# Patient Record
Sex: FEMALE | Race: WHITE | Employment: OTHER | URBAN - METROPOLITAN AREA
[De-identification: names, ages, dates, MRNs, and addresses within clinical notes are randomized per-mention and may not be internally consistent; named-entity substitution may affect disease eponyms.]

---

## 2017-03-13 ENCOUNTER — OFFICE VISIT (OUTPATIENT)
Dept: CARDIOLOGY CLINIC | Age: 72
End: 2017-03-13

## 2017-03-13 VITALS
DIASTOLIC BLOOD PRESSURE: 64 MMHG | WEIGHT: 204 LBS | BODY MASS INDEX: 38.51 KG/M2 | HEART RATE: 70 BPM | HEIGHT: 61 IN | SYSTOLIC BLOOD PRESSURE: 122 MMHG

## 2017-03-13 DIAGNOSIS — I47.1 PAROXYSMAL SUPRAVENTRICULAR TACHYCARDIA (HCC): Primary | ICD-10-CM

## 2017-03-13 DIAGNOSIS — E78.5 HYPERLIPIDEMIA, UNSPECIFIED HYPERLIPIDEMIA TYPE: ICD-10-CM

## 2017-03-13 DIAGNOSIS — R07.89 OTHER CHEST PAIN: ICD-10-CM

## 2017-03-13 DIAGNOSIS — I10 ESSENTIAL HYPERTENSION, BENIGN: ICD-10-CM

## 2017-03-13 DIAGNOSIS — I25.10 ATHEROSCLEROSIS OF NATIVE CORONARY ARTERY OF NATIVE HEART WITHOUT ANGINA PECTORIS: ICD-10-CM

## 2017-03-13 PROBLEM — R07.9 CHEST PAIN: Status: ACTIVE | Noted: 2017-03-13

## 2017-03-13 PROCEDURE — 99214 OFFICE O/P EST MOD 30 MIN: CPT | Performed by: INTERNAL MEDICINE

## 2017-03-21 DIAGNOSIS — R94.39 ABNORMAL CARDIOVASCULAR STRESS TEST: Primary | ICD-10-CM

## 2017-03-22 RX ORDER — CLOPIDOGREL BISULFATE 75 MG/1
75 TABLET ORAL DAILY
Qty: 90 TABLET | Refills: 3 | Status: SHIPPED | OUTPATIENT
Start: 2017-03-22 | End: 2017-09-25 | Stop reason: SDUPTHER

## 2017-04-05 ENCOUNTER — OFFICE VISIT (OUTPATIENT)
Dept: CARDIOLOGY CLINIC | Age: 72
End: 2017-04-05

## 2017-04-05 VITALS
HEART RATE: 62 BPM | SYSTOLIC BLOOD PRESSURE: 90 MMHG | HEIGHT: 61 IN | WEIGHT: 199 LBS | BODY MASS INDEX: 37.57 KG/M2 | DIASTOLIC BLOOD PRESSURE: 58 MMHG

## 2017-04-05 DIAGNOSIS — I25.10 ATHEROSCLEROSIS OF NATIVE CORONARY ARTERY OF NATIVE HEART WITHOUT ANGINA PECTORIS: Primary | ICD-10-CM

## 2017-04-05 DIAGNOSIS — I10 ESSENTIAL HYPERTENSION, BENIGN: ICD-10-CM

## 2017-04-05 DIAGNOSIS — E78.49 OTHER HYPERLIPIDEMIA: ICD-10-CM

## 2017-04-05 PROCEDURE — 99214 OFFICE O/P EST MOD 30 MIN: CPT | Performed by: NURSE PRACTITIONER

## 2017-07-06 ENCOUNTER — OFFICE VISIT (OUTPATIENT)
Dept: CARDIOLOGY CLINIC | Age: 72
End: 2017-07-06

## 2017-07-06 VITALS
WEIGHT: 198 LBS | HEART RATE: 62 BPM | DIASTOLIC BLOOD PRESSURE: 58 MMHG | BODY MASS INDEX: 36.44 KG/M2 | SYSTOLIC BLOOD PRESSURE: 106 MMHG | HEIGHT: 62 IN

## 2017-07-06 DIAGNOSIS — E78.49 OTHER HYPERLIPIDEMIA: ICD-10-CM

## 2017-07-06 DIAGNOSIS — I25.10 ATHEROSCLEROSIS OF NATIVE CORONARY ARTERY OF NATIVE HEART WITHOUT ANGINA PECTORIS: Primary | ICD-10-CM

## 2017-07-06 DIAGNOSIS — I10 ESSENTIAL HYPERTENSION, BENIGN: ICD-10-CM

## 2017-07-06 PROCEDURE — 99214 OFFICE O/P EST MOD 30 MIN: CPT | Performed by: INTERNAL MEDICINE

## 2017-09-20 RX ORDER — RANOLAZINE 500 MG/1
500 TABLET, EXTENDED RELEASE ORAL 2 TIMES DAILY
Qty: 60 TABLET | Refills: 5 | Status: SHIPPED | OUTPATIENT
Start: 2017-09-20 | End: 2017-09-25 | Stop reason: SDUPTHER

## 2017-09-25 ENCOUNTER — OFFICE VISIT (OUTPATIENT)
Dept: CARDIOLOGY CLINIC | Age: 72
End: 2017-09-25

## 2017-09-25 VITALS
DIASTOLIC BLOOD PRESSURE: 60 MMHG | WEIGHT: 198 LBS | HEIGHT: 61 IN | HEART RATE: 60 BPM | BODY MASS INDEX: 37.38 KG/M2 | SYSTOLIC BLOOD PRESSURE: 110 MMHG

## 2017-09-25 DIAGNOSIS — E78.5 HYPERLIPIDEMIA, UNSPECIFIED HYPERLIPIDEMIA TYPE: ICD-10-CM

## 2017-09-25 DIAGNOSIS — I25.10 ATHEROSCLEROSIS OF NATIVE CORONARY ARTERY OF NATIVE HEART WITHOUT ANGINA PECTORIS: Primary | ICD-10-CM

## 2017-09-25 DIAGNOSIS — I10 ESSENTIAL HYPERTENSION, BENIGN: ICD-10-CM

## 2017-09-25 PROCEDURE — 99214 OFFICE O/P EST MOD 30 MIN: CPT | Performed by: INTERNAL MEDICINE

## 2017-09-25 RX ORDER — RANOLAZINE 500 MG/1
500 TABLET, EXTENDED RELEASE ORAL 2 TIMES DAILY
Qty: 180 TABLET | Refills: 3 | Status: SHIPPED | OUTPATIENT
Start: 2017-09-25 | End: 2018-03-19 | Stop reason: SDUPTHER

## 2017-09-25 RX ORDER — CLOPIDOGREL BISULFATE 75 MG/1
75 TABLET ORAL DAILY
Qty: 90 TABLET | Refills: 3 | Status: SHIPPED | OUTPATIENT
Start: 2017-09-25 | End: 2018-03-19 | Stop reason: SDUPTHER

## 2018-03-19 ENCOUNTER — OFFICE VISIT (OUTPATIENT)
Dept: CARDIOLOGY CLINIC | Age: 73
End: 2018-03-19

## 2018-03-19 VITALS
DIASTOLIC BLOOD PRESSURE: 56 MMHG | WEIGHT: 192 LBS | HEART RATE: 60 BPM | BODY MASS INDEX: 36.25 KG/M2 | SYSTOLIC BLOOD PRESSURE: 118 MMHG | HEIGHT: 61 IN

## 2018-03-19 DIAGNOSIS — I25.10 ATHEROSCLEROSIS OF NATIVE CORONARY ARTERY OF NATIVE HEART WITHOUT ANGINA PECTORIS: ICD-10-CM

## 2018-03-19 DIAGNOSIS — E78.5 HYPERLIPIDEMIA, UNSPECIFIED HYPERLIPIDEMIA TYPE: Primary | ICD-10-CM

## 2018-03-19 DIAGNOSIS — I10 ESSENTIAL HYPERTENSION, BENIGN: ICD-10-CM

## 2018-03-19 PROCEDURE — 3017F COLORECTAL CA SCREEN DOC REV: CPT | Performed by: INTERNAL MEDICINE

## 2018-03-19 PROCEDURE — 99214 OFFICE O/P EST MOD 30 MIN: CPT | Performed by: INTERNAL MEDICINE

## 2018-03-19 PROCEDURE — 1036F TOBACCO NON-USER: CPT | Performed by: INTERNAL MEDICINE

## 2018-03-19 PROCEDURE — 1123F ACP DISCUSS/DSCN MKR DOCD: CPT | Performed by: INTERNAL MEDICINE

## 2018-03-19 PROCEDURE — G8428 CUR MEDS NOT DOCUMENT: HCPCS | Performed by: INTERNAL MEDICINE

## 2018-03-19 PROCEDURE — G8417 CALC BMI ABV UP PARAM F/U: HCPCS | Performed by: INTERNAL MEDICINE

## 2018-03-19 PROCEDURE — G8484 FLU IMMUNIZE NO ADMIN: HCPCS | Performed by: INTERNAL MEDICINE

## 2018-03-19 PROCEDURE — G8400 PT W/DXA NO RESULTS DOC: HCPCS | Performed by: INTERNAL MEDICINE

## 2018-03-19 PROCEDURE — 1090F PRES/ABSN URINE INCON ASSESS: CPT | Performed by: INTERNAL MEDICINE

## 2018-03-19 PROCEDURE — 4040F PNEUMOC VAC/ADMIN/RCVD: CPT | Performed by: INTERNAL MEDICINE

## 2018-03-19 PROCEDURE — 3014F SCREEN MAMMO DOC REV: CPT | Performed by: INTERNAL MEDICINE

## 2018-03-19 PROCEDURE — G8598 ASA/ANTIPLAT THER USED: HCPCS | Performed by: INTERNAL MEDICINE

## 2018-03-19 RX ORDER — DILTIAZEM HYDROCHLORIDE 240 MG/1
240 CAPSULE, COATED, EXTENDED RELEASE ORAL DAILY
Qty: 90 CAPSULE | Refills: 3 | Status: SHIPPED | OUTPATIENT
Start: 2018-03-19 | End: 2019-03-13 | Stop reason: SDUPTHER

## 2018-03-19 RX ORDER — RANOLAZINE 500 MG/1
500 TABLET, EXTENDED RELEASE ORAL 2 TIMES DAILY
Qty: 180 TABLET | Refills: 3 | Status: SHIPPED | OUTPATIENT
Start: 2018-03-19 | End: 2019-03-25 | Stop reason: SDUPTHER

## 2018-03-19 RX ORDER — CLOPIDOGREL BISULFATE 75 MG/1
75 TABLET ORAL DAILY
Qty: 90 TABLET | Refills: 3 | Status: SHIPPED | OUTPATIENT
Start: 2018-03-19 | End: 2018-04-03 | Stop reason: SDUPTHER

## 2018-03-19 RX ORDER — NITROGLYCERIN 0.4 MG/1
0.4 TABLET SUBLINGUAL EVERY 5 MIN PRN
Qty: 25 TABLET | Refills: 3 | Status: SHIPPED | OUTPATIENT
Start: 2018-03-19 | End: 2019-05-29 | Stop reason: SDUPTHER

## 2018-03-19 RX ORDER — ATENOLOL 25 MG/1
50 TABLET ORAL DAILY
Qty: 90 TABLET | Refills: 3 | Status: SHIPPED | OUTPATIENT
Start: 2018-03-19 | End: 2020-04-29

## 2018-03-19 RX ORDER — FUROSEMIDE 40 MG/1
40 TABLET ORAL DAILY
Qty: 30 TABLET | Refills: 3 | Status: SHIPPED | OUTPATIENT
Start: 2018-03-19 | End: 2018-09-24 | Stop reason: SDUPTHER

## 2018-03-19 NOTE — LETTER
· Heart is regular rate and rhythm with normal S1, S2, short 1/6 STELLA  · The carotid upstroke is normal, bruit noted   · JVP is not elevated  · Peripheral pulses are symmetrical  · There is no clubbing, cyanosis of the extremities  · Wearing compression hose, beginnings of venous stasis skin changes  · Femoral Arteries: 2+ and equal  · Pedal Pulses: 2+ and equal   Abdomen:  · No masses or tenderness  · Normal bowel sounds  Neurological/Psychiatric:  · Alert and oriented x3  · Moves all extremities well, cath site stable  · Exhibits normal gait balance and coordination      Assessment/Plan  1. Chest pain -resolved, cath 3/17 no intervention, now on ranexa  Has not had any chest pain, on Ranexa now  2 Paroxysmal supraventricular tachycardia -rate controlled   3. Other and unspecified hyperlipidemia -stable on labs 6/17   4 Essential hypertension, benign -stable    5 Coronary atherosclerosis of native coronary artery -stable  CATH 3/17 (after abnormal GXT) LVEDP - 6, normal WM with EF 60%  Cors:LM - nl, LAD - 95% prox and mid; mid and distal vessel fills by LIMA, LCX - 100% prox with collateral filling of OM1 and the distal LCX by L-L collaterals. RCA - dominant and with irreg throughout; PDA - 50% mid. [address] - LAD patent, Y-RA from LIMA to DX patent, occluded to PDA  Plan: medical therapy. MV defect from occluded LCX with collaterals  GXT 10/11: normal myocardial perfusion with EF 67%  GXT 10/08: normal myocardial perfusion, runs of atrial fibrillation with RVR   CABG 03: LIMA-LAD, RA T-graft off LIMA to diagonal and posterior descending artery   6. Carotid stenosis -stable  Doppler 3/18/16: 41-96% KAROL, 7-69% LICA  Doppler 6/9/92: 24-56% KAROL, 0-08% LICA      Stable cardiac status. No med changes. FU 6 months. If you have questions, please do not hesitate to call me. I look forward to following Natasha Diehl along with you.     Sincerely,        Karol Washington MD

## 2018-03-22 NOTE — COMMUNICATION BODY
abdominal pain, appetite loss, blood in stools. No change in bowel or bladder habits. · Genitourinary: No nocturia, dysuria, trouble voiding  · Musculoskeletal:  No gait disturbance, weakness or joint complaints. · Integumentary: No rash or pruritis. · Neurological: No headache, change in muscle strength, numbness or tingling. No change in gait, balance, coordination, mood, affect, memory, mentation, behavior. · Psychiatric: No anxiety or depression  · Endocrine: No malaise or fever  · Hematologic/Lymphatic: No abnormal bruising or bleeding, blood clots or swollen lymph nodes. · Allergic/Immunologic: No nasal congestion or hives. Physical Examination:    Vitals:    03/19/18 1301   BP: (!) 118/56   Site: Left Arm   Position: Sitting   Cuff Size: Large Adult   Pulse: 60   Weight: 192 lb (87.1 kg)   Height: 5' 1\" (1.549 m)     Wt Readings from Last 3 Encounters:   03/19/18 192 lb (87.1 kg)   09/25/17 198 lb (89.8 kg)   07/06/17 198 lb (89.8 kg)     BP Readings from Last 3 Encounters:   03/19/18 (!) 118/56   09/25/17 110/60   07/06/17 (!) 106/58     Constitutional and General Appearance:  appears stated age  Respiratory:  · Normal excursion and expansion without use of accessory muscles  · Resp Auscultation: Normal breath sounds without dullness  Cardiovascular:  · The apical impulses not displaced  · Heart is regular rate and rhythm with normal S1, S2, short 1/6 STELLA  · The carotid upstroke is normal, bruit noted   · JVP is not elevated  · Peripheral pulses are symmetrical  · There is no clubbing, cyanosis of the extremities  · Wearing compression hose, beginnings of venous stasis skin changes  · Femoral Arteries: 2+ and equal  · Pedal Pulses: 2+ and equal   Abdomen:  · No masses or tenderness  · Normal bowel sounds  Neurological/Psychiatric:  · Alert and oriented x3  · Moves all extremities well, cath site stable  · Exhibits normal gait balance and coordination      Assessment/Plan  1.    Chest pain -resolved, cath 3/17 no intervention, now on ranexa  Has not had any chest pain, on Ranexa now  2 Paroxysmal supraventricular tachycardia -rate controlled   3. Other and unspecified hyperlipidemia -stable on labs 6/17   4 Essential hypertension, benign -stable    5 Coronary atherosclerosis of native coronary artery -stable  CATH 3/17 (after abnormal GXT) LVEDP - 6, normal WM with EF 60%  Cors:LM - nl, LAD - 95% prox and mid; mid and distal vessel fills by LIMA, LCX - 100% prox with collateral filling of OM1 and the distal LCX by L-L collaterals. RCA - dominant and with irreg throughout; PDA - 50% mid. [address] - LAD patent, Y-RA from LIMA to DX patent, occluded to PDA  Plan: medical therapy. MV defect from occluded LCX with collaterals  GXT 10/11: normal myocardial perfusion with EF 67%  GXT 10/08: normal myocardial perfusion, runs of atrial fibrillation with RVR   CABG 03: LIMA-LAD, RA T-graft off LIMA to diagonal and posterior descending artery   6. Carotid stenosis -stable  Doppler 3/18/16: 63-42% KAROL, 5-77% LICA  Doppler 7/2/81: 89-69% KAROL, 5-79% LICA      Stable cardiac status. No med changes. FU 6 months.

## 2018-04-03 RX ORDER — CLOPIDOGREL BISULFATE 75 MG/1
75 TABLET ORAL DAILY
Qty: 90 TABLET | Refills: 3 | Status: SHIPPED | OUTPATIENT
Start: 2018-04-03 | End: 2019-03-25 | Stop reason: SDUPTHER

## 2018-09-24 ENCOUNTER — OFFICE VISIT (OUTPATIENT)
Dept: CARDIOLOGY CLINIC | Age: 73
End: 2018-09-24
Payer: COMMERCIAL

## 2018-09-24 VITALS
HEART RATE: 61 BPM | BODY MASS INDEX: 39.08 KG/M2 | DIASTOLIC BLOOD PRESSURE: 64 MMHG | WEIGHT: 207 LBS | SYSTOLIC BLOOD PRESSURE: 120 MMHG | HEIGHT: 61 IN

## 2018-09-24 DIAGNOSIS — E78.5 HYPERLIPIDEMIA, UNSPECIFIED HYPERLIPIDEMIA TYPE: ICD-10-CM

## 2018-09-24 DIAGNOSIS — I47.1 PAROXYSMAL SUPRAVENTRICULAR TACHYCARDIA (HCC): Primary | ICD-10-CM

## 2018-09-24 DIAGNOSIS — I25.10 ATHEROSCLEROSIS OF NATIVE CORONARY ARTERY OF NATIVE HEART WITHOUT ANGINA PECTORIS: ICD-10-CM

## 2018-09-24 DIAGNOSIS — I10 ESSENTIAL HYPERTENSION, BENIGN: ICD-10-CM

## 2018-09-24 PROCEDURE — 1101F PT FALLS ASSESS-DOCD LE1/YR: CPT | Performed by: INTERNAL MEDICINE

## 2018-09-24 PROCEDURE — 1036F TOBACCO NON-USER: CPT | Performed by: INTERNAL MEDICINE

## 2018-09-24 PROCEDURE — 93000 ELECTROCARDIOGRAM COMPLETE: CPT | Performed by: INTERNAL MEDICINE

## 2018-09-24 PROCEDURE — G8427 DOCREV CUR MEDS BY ELIG CLIN: HCPCS | Performed by: INTERNAL MEDICINE

## 2018-09-24 PROCEDURE — G8598 ASA/ANTIPLAT THER USED: HCPCS | Performed by: INTERNAL MEDICINE

## 2018-09-24 PROCEDURE — G8417 CALC BMI ABV UP PARAM F/U: HCPCS | Performed by: INTERNAL MEDICINE

## 2018-09-24 PROCEDURE — 99214 OFFICE O/P EST MOD 30 MIN: CPT | Performed by: INTERNAL MEDICINE

## 2018-09-24 PROCEDURE — 4040F PNEUMOC VAC/ADMIN/RCVD: CPT | Performed by: INTERNAL MEDICINE

## 2018-09-24 PROCEDURE — 3017F COLORECTAL CA SCREEN DOC REV: CPT | Performed by: INTERNAL MEDICINE

## 2018-09-24 PROCEDURE — 1090F PRES/ABSN URINE INCON ASSESS: CPT | Performed by: INTERNAL MEDICINE

## 2018-09-24 PROCEDURE — 1123F ACP DISCUSS/DSCN MKR DOCD: CPT | Performed by: INTERNAL MEDICINE

## 2018-09-24 PROCEDURE — G8400 PT W/DXA NO RESULTS DOC: HCPCS | Performed by: INTERNAL MEDICINE

## 2018-09-24 RX ORDER — POTASSIUM CHLORIDE 750 MG/1
20 CAPSULE, EXTENDED RELEASE ORAL 2 TIMES DAILY
Qty: 120 CAPSULE | Refills: 3 | Status: SHIPPED | OUTPATIENT
Start: 2018-09-24 | End: 2020-04-29

## 2018-09-24 RX ORDER — FUROSEMIDE 40 MG/1
40 TABLET ORAL 2 TIMES DAILY
Qty: 60 TABLET | Refills: 3 | Status: SHIPPED | OUTPATIENT
Start: 2018-09-24 | End: 2019-05-29 | Stop reason: SDUPTHER

## 2018-09-24 NOTE — LETTER
[address] [facility] [place] [address] [address] [place] [address]  Phone: [phone]  Fax: [phone]    Gayathri Sanchez MD        September 25, 2018     [facility]  No address on file    Patient: Delvin Garcia  MR Number: [id number]  YOB: 1945  Date of Visit: 9/24/2018    Dear  [facility]:    Thank you for the request for consultation for Delvin Garcia to me for the evaluation of Ms Lucio Flores. Below are the relevant portions of my assessment and plan of care. [address]   Cardiac Evaluation      Patient: Delvin Garcia  YOB: 1945  Date: 9/24/18       Chief Complaint   Patient presents with    Hypertension    Hyperlipidemia    Tachycardia        Referring provider: [facility]    History of Present Illness:   Ms Lucio Flores comes to the office today for follow up of her CAD. She had c/o chest pain, then abnormal GXT which led to cath showing progressive disease of the LCX with collaterals but no intervention 3/17. She was placed on Ranexa for chest pain. She has hypertension and hyperlipidemia. Kim Denton underwent CABG 2/03 with LIMA-LAD, left radial graft extended as T-graft from LIMA to diagonal and PDA. Today, Ms Lucio Flores states she has been fine. She denies any palpitations, LUEVANO, dizziness. She walks for exercise. States she has \"occasional\" exertional chest pain that resolves with NTG. Past Medical History:   has a past medical history of CAD (coronary artery disease); Diabetes; Hyperlipidemia; Hypertension; Leg edema; and Obesity. Surgical History:   has a past surgical history that includes Cardiac catheterization and Coronary artery bypass graft. Social History:   reports that she has never smoked. She does not have any smokeless tobacco history on file. She reports that she drinks alcohol. She is a former OR and PACU nurse.   Her son was treated for a severe head injury · Heart is regular rate and rhythm with normal S1, S2, short 1/6 STELLA  · The carotid upstroke is normal, bruit noted   · JVP is not elevated  · Peripheral pulses are symmetrical  · There is no clubbing, cyanosis of the extremities  · 2+ edema BLE,  Starting with venous stasis skin changes  · Femoral Arteries: 2+ and equal  · Pedal Pulses: 2+ and equal   Abdomen:  · No masses or tenderness  · Normal bowel sounds  Neurological/Psychiatric:  · Alert and oriented x3  · Moves all extremities well, cath site stable  · Exhibits normal gait balance and coordination      Assessment/Plan  1. Chest pain -occasional chest pain w/ exertion, cath 3/17 no intervention, now on ranexa  2 Paroxysmal supraventricular tachycardia -rate controlled, EKG>sinus rhythm, T wave changes   3. Other and unspecified hyperlipidemia -stable on labs 9/19/18: ; TRIG 150; HDL 49; LDL 75   4 Essential hypertension, benign -stable    5 Coronary atherosclerosis of native coronary artery -stable  CATH 3/17 (after abnormal GXT) LVEDP - 6, normal WM with EF 60%  Cors:LM - nl, LAD - 95% prox and mid; mid and distal vessel fills by LIMA, LCX - 100% prox with collateral filling of OM1 and the distal LCX by L-L collaterals. RCA - dominant and with irreg throughout; PDA - 50% mid. [address] - LAD patent, Y-RA from LIMA to DX patent, occluded to PDA  Plan: medical therapy. MV defect from occluded LCX with collaterals  GXT 10/11: normal myocardial perfusion with EF 67%  GXT 10/08: normal myocardial perfusion, runs of atrial fibrillation with RVR   CABG 03: LIMA-LAD, RA T-graft off LIMA to diagonal and posterior descending artery   6. Carotid stenosis -stable  Doppler 3/18/16: 11-76% KAROL, 6-42% LICA  Doppler 9/4/64: 28-75% KAROL, 1-84% LICA      PLAN:  Advised to wear compression and take extra Lasix/Klor-Con as needed. Regular exercise encouraged. FU 6 months. Scribe's attestation:  This note was scribed in the presence of Dr Gumaro Price MD by Marycruz Metz RN. The scribe's documentation has been prepared under my direction and personally reviewed by me in its entirety. I confirm that the note above accurately reflects all work, treatment, procedures, and medical decision making performed by me. If you have questions, please do not hesitate to call me. I look forward to following Martinez Hughes along with you.     Sincerely,        Aleena Roy MD

## 2018-09-24 NOTE — PROGRESS NOTES
[facility]   Cardiac Evaluation      Patient: Kika Yañez  YOB: 1945  Date: 9/24/18       Chief Complaint   Patient presents with    Hypertension    Hyperlipidemia    Tachycardia        Referring provider: Ariel Gr    History of Present Illness:   Ms Phil España comes to the office today for follow up of her CAD. She had c/o chest pain, then abnormal GXT which led to cath showing progressive disease of the LCX with collaterals but no intervention 3/17. She was placed on Ranexa for chest pain. She has hypertension and hyperlipidemia. Ryan Bowling underwent CABG 2/03 with LIMA-LAD, left radial graft extended as T-graft from LIMA to diagonal and PDA. Today, Ms Phil España states she has been fine. She denies any palpitations, LUEVANO, dizziness. She walks for exercise. States she has \"occasional\" exertional chest pain that resolves with NTG. Past Medical History:   has a past medical history of CAD (coronary artery disease); Diabetes; Hyperlipidemia; Hypertension; Leg edema; and Obesity. Surgical History:   has a past surgical history that includes Cardiac catheterization and Coronary artery bypass graft. Social History:   reports that she has never smoked. She does not have any smokeless tobacco history on file. She reports that she drinks alcohol. 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 Her granddaughter has been treated for Crohns and has been diagnosed with lymphoma (in remission currently). Family History:  family history includes Heart Disease in her father. Allergies:  Lipitor [atorvastatin]     Review of Systems:   · Constitutional: there has been no unanticipated weight loss. No change in energy or activity level   · Eyes: No visual changes   · ENT: No Headaches, hearing loss or vertigo. No mouth sores or sore throat.   · Cardiovascular: Reviewed in HPI  · Respiratory: No cough or coordination      Assessment/Plan  1. Chest pain -occasional chest pain w/ exertion, cath 3/17 no intervention, now on ranexa  2 Paroxysmal supraventricular tachycardia -rate controlled, EKG>sinus rhythm, T wave changes   3. Other and unspecified hyperlipidemia -stable on labs 9/19/18: ; TRIG 150; HDL 49; LDL 75   4 Essential hypertension, benign -stable    5 Coronary atherosclerosis of native coronary artery -stable  CATH 3/17 (after abnormal GXT) LVEDP - 6, normal WM with EF 60%  Cors:LM - nl, LAD - 95% prox and mid; mid and distal vessel fills by LIMA, LCX - 100% prox with collateral filling of OM1 and the distal LCX by L-L collaterals. RCA - dominant and with irreg throughout; PDA - 50% mid. [address] - LAD patent, Y-RA from LIMA to DX patent, occluded to PDA  Plan: medical therapy. MV defect from occluded LCX with collaterals  GXT 10/11: normal myocardial perfusion with EF 67%  GXT 10/08: normal myocardial perfusion, runs of atrial fibrillation with RVR   CABG 03: LIMA-LAD, RA T-graft off LIMA to diagonal and posterior descending artery   6. Carotid stenosis -stable  Doppler 3/18/16: 96-78% KAROL, 8-23% LICA  Doppler 5/4/27: 68-68% KAROL, 7-19% LICA      PLAN:  Advised to wear compression and take extra Lasix/Klor-Con as needed. Regular exercise encouraged. FU 6 months. Scribe's attestation: This note was scribed in the presence of Dr Dylan Griffith MD by Evans Maurer RN. The scribe's documentation has been prepared under my direction and personally reviewed by me in its entirety. I confirm that the note above accurately reflects all work, treatment, procedures, and medical decision making performed by me.

## 2018-09-25 NOTE — COMMUNICATION BODY
wheezing, no sputum production. No hematemesis. · Gastrointestinal: No abdominal pain, appetite loss, blood in stools. No change in bowel or bladder habits. · Genitourinary: No nocturia, dysuria, trouble voiding  · Musculoskeletal:  No gait disturbance, weakness or joint complaints. · Integumentary: No rash or pruritis. · Neurological: No headache, change in muscle strength, numbness or tingling. No change in gait, balance, coordination, mood, affect, memory, mentation, behavior. · Psychiatric: No anxiety or depression  · Endocrine: No malaise or fever  · Hematologic/Lymphatic: No abnormal bruising or bleeding, blood clots or swollen lymph nodes. · Allergic/Immunologic: No nasal congestion or hives.     Physical Examination:    Vitals:    09/24/18 1307   BP: 120/64   Site: Left Upper Arm   Position: Sitting   Cuff Size: Large Adult   Pulse: 61   Weight: 207 lb (93.9 kg)   Height: 5' 1\" (1.549 m)     Wt Readings from Last 3 Encounters:   09/24/18 207 lb (93.9 kg)   03/19/18 192 lb (87.1 kg)   09/25/17 198 lb (89.8 kg)     BP Readings from Last 3 Encounters:   09/24/18 120/64   03/19/18 (!) 118/56   09/25/17 110/60     Constitutional and General Appearance:  appears stated age  Respiratory:  · Normal excursion and expansion without use of accessory muscles  · Resp Auscultation: Normal breath sounds without dullness  Cardiovascular:  · The apical impulses not displaced  · Heart is regular rate and rhythm with normal S1, S2, short 1/6 STELLA  · The carotid upstroke is normal, bruit noted   · JVP is not elevated  · Peripheral pulses are symmetrical  · There is no clubbing, cyanosis of the extremities  · 2+ edema BLE,  Starting with venous stasis skin changes  · Femoral Arteries: 2+ and equal  · Pedal Pulses: 2+ and equal   Abdomen:  · No masses or tenderness  · Normal bowel sounds  Neurological/Psychiatric:  · Alert and oriented x3  · Moves all extremities well, cath site stable  · Exhibits normal gait balance and

## 2019-03-13 RX ORDER — DILTIAZEM HYDROCHLORIDE 240 MG/1
240 CAPSULE, COATED, EXTENDED RELEASE ORAL DAILY
Qty: 90 CAPSULE | Refills: 3 | Status: SHIPPED | OUTPATIENT
Start: 2019-03-13 | End: 2019-03-25

## 2019-03-25 ENCOUNTER — OFFICE VISIT (OUTPATIENT)
Dept: CARDIOLOGY CLINIC | Age: 74
End: 2019-03-25
Payer: COMMERCIAL

## 2019-03-25 VITALS
SYSTOLIC BLOOD PRESSURE: 94 MMHG | OXYGEN SATURATION: 97 % | WEIGHT: 194 LBS | DIASTOLIC BLOOD PRESSURE: 50 MMHG | BODY MASS INDEX: 36.63 KG/M2 | HEART RATE: 71 BPM | HEIGHT: 61 IN

## 2019-03-25 DIAGNOSIS — I10 ESSENTIAL HYPERTENSION, BENIGN: ICD-10-CM

## 2019-03-25 DIAGNOSIS — E78.5 HYPERLIPIDEMIA, UNSPECIFIED HYPERLIPIDEMIA TYPE: ICD-10-CM

## 2019-03-25 DIAGNOSIS — I65.23 BILATERAL CAROTID ARTERY STENOSIS: ICD-10-CM

## 2019-03-25 DIAGNOSIS — I47.1 PAROXYSMAL SUPRAVENTRICULAR TACHYCARDIA (HCC): Primary | ICD-10-CM

## 2019-03-25 DIAGNOSIS — I25.10 ATHEROSCLEROSIS OF NATIVE CORONARY ARTERY OF NATIVE HEART WITHOUT ANGINA PECTORIS: ICD-10-CM

## 2019-03-25 PROCEDURE — G8427 DOCREV CUR MEDS BY ELIG CLIN: HCPCS | Performed by: INTERNAL MEDICINE

## 2019-03-25 PROCEDURE — 1036F TOBACCO NON-USER: CPT | Performed by: INTERNAL MEDICINE

## 2019-03-25 PROCEDURE — G8400 PT W/DXA NO RESULTS DOC: HCPCS | Performed by: INTERNAL MEDICINE

## 2019-03-25 PROCEDURE — 1101F PT FALLS ASSESS-DOCD LE1/YR: CPT | Performed by: INTERNAL MEDICINE

## 2019-03-25 PROCEDURE — 1123F ACP DISCUSS/DSCN MKR DOCD: CPT | Performed by: INTERNAL MEDICINE

## 2019-03-25 PROCEDURE — 4040F PNEUMOC VAC/ADMIN/RCVD: CPT | Performed by: INTERNAL MEDICINE

## 2019-03-25 PROCEDURE — G8417 CALC BMI ABV UP PARAM F/U: HCPCS | Performed by: INTERNAL MEDICINE

## 2019-03-25 PROCEDURE — 3017F COLORECTAL CA SCREEN DOC REV: CPT | Performed by: INTERNAL MEDICINE

## 2019-03-25 PROCEDURE — G8598 ASA/ANTIPLAT THER USED: HCPCS | Performed by: INTERNAL MEDICINE

## 2019-03-25 PROCEDURE — 99214 OFFICE O/P EST MOD 30 MIN: CPT | Performed by: INTERNAL MEDICINE

## 2019-03-25 PROCEDURE — 1090F PRES/ABSN URINE INCON ASSESS: CPT | Performed by: INTERNAL MEDICINE

## 2019-03-25 PROCEDURE — G8484 FLU IMMUNIZE NO ADMIN: HCPCS | Performed by: INTERNAL MEDICINE

## 2019-03-25 RX ORDER — DILTIAZEM HYDROCHLORIDE 120 MG/1
120 CAPSULE, COATED, EXTENDED RELEASE ORAL DAILY
Qty: 90 CAPSULE | Refills: 3 | Status: SHIPPED | OUTPATIENT
Start: 2019-03-25 | End: 2019-06-07 | Stop reason: SDUPTHER

## 2019-03-25 RX ORDER — CALCIUM CARBONATE 500(1250)
1000 TABLET ORAL DAILY
COMMUNITY

## 2019-03-25 RX ORDER — RANOLAZINE 500 MG/1
500 TABLET, EXTENDED RELEASE ORAL 2 TIMES DAILY
Qty: 180 TABLET | Refills: 3 | Status: SHIPPED | OUTPATIENT
Start: 2019-03-25 | End: 2019-06-07 | Stop reason: SDUPTHER

## 2019-03-25 RX ORDER — CLOPIDOGREL BISULFATE 75 MG/1
75 TABLET ORAL DAILY
Qty: 90 TABLET | Refills: 3 | Status: SHIPPED | OUTPATIENT
Start: 2019-03-25 | End: 2019-06-07 | Stop reason: SDUPTHER

## 2019-04-05 ENCOUNTER — HOSPITAL ENCOUNTER (OUTPATIENT)
Dept: CARDIOLOGY | Age: 74
Discharge: HOME | End: 2019-04-05
Payer: MEDICARE

## 2019-04-05 PROCEDURE — 93880 EXTRACRANIAL BILAT STUDY: CPT

## 2019-05-29 RX ORDER — NITROGLYCERIN 0.4 MG/1
0.4 TABLET SUBLINGUAL EVERY 5 MIN PRN
Qty: 25 TABLET | Refills: 3 | Status: SHIPPED | OUTPATIENT
Start: 2019-05-29 | End: 2021-03-15

## 2019-05-29 RX ORDER — FUROSEMIDE 40 MG/1
40 TABLET ORAL 2 TIMES DAILY
Qty: 60 TABLET | Refills: 5 | Status: SHIPPED | OUTPATIENT
Start: 2019-05-29

## 2019-06-07 RX ORDER — DILTIAZEM HYDROCHLORIDE 120 MG/1
120 CAPSULE, COATED, EXTENDED RELEASE ORAL DAILY
Qty: 90 CAPSULE | Refills: 3 | Status: SHIPPED | OUTPATIENT
Start: 2019-06-07 | End: 2020-04-20 | Stop reason: SDUPTHER

## 2019-06-07 RX ORDER — CLOPIDOGREL BISULFATE 75 MG/1
75 TABLET ORAL DAILY
Qty: 90 TABLET | Refills: 3 | Status: SHIPPED | OUTPATIENT
Start: 2019-06-07 | End: 2020-04-20 | Stop reason: SDUPTHER

## 2019-06-07 RX ORDER — RANOLAZINE 500 MG/1
500 TABLET, EXTENDED RELEASE ORAL 2 TIMES DAILY
Qty: 180 TABLET | Refills: 3 | Status: SHIPPED | OUTPATIENT
Start: 2019-06-07 | End: 2020-03-26 | Stop reason: SDUPTHER

## 2019-10-25 ENCOUNTER — OFFICE VISIT (OUTPATIENT)
Dept: CARDIOLOGY CLINIC | Age: 74
End: 2019-10-25
Payer: MEDICARE

## 2019-10-25 VITALS
DIASTOLIC BLOOD PRESSURE: 70 MMHG | HEIGHT: 61 IN | WEIGHT: 185 LBS | SYSTOLIC BLOOD PRESSURE: 114 MMHG | HEART RATE: 68 BPM | BODY MASS INDEX: 34.93 KG/M2 | OXYGEN SATURATION: 99 %

## 2019-10-25 DIAGNOSIS — E78.5 HYPERLIPIDEMIA, UNSPECIFIED HYPERLIPIDEMIA TYPE: ICD-10-CM

## 2019-10-25 DIAGNOSIS — I10 ESSENTIAL HYPERTENSION: ICD-10-CM

## 2019-10-25 DIAGNOSIS — I25.10 CORONARY ARTERY DISEASE INVOLVING NATIVE CORONARY ARTERY OF NATIVE HEART WITHOUT ANGINA PECTORIS: Primary | ICD-10-CM

## 2019-10-25 DIAGNOSIS — I47.1 PAROXYSMAL SUPRAVENTRICULAR TACHYCARDIA (HCC): ICD-10-CM

## 2019-10-25 DIAGNOSIS — I65.23 BILATERAL CAROTID ARTERY STENOSIS: ICD-10-CM

## 2019-10-25 DIAGNOSIS — I25.10 ATHEROSCLEROSIS OF NATIVE CORONARY ARTERY OF NATIVE HEART WITHOUT ANGINA PECTORIS: ICD-10-CM

## 2019-10-25 PROCEDURE — G8427 DOCREV CUR MEDS BY ELIG CLIN: HCPCS | Performed by: INTERNAL MEDICINE

## 2019-10-25 PROCEDURE — 1090F PRES/ABSN URINE INCON ASSESS: CPT | Performed by: INTERNAL MEDICINE

## 2019-10-25 PROCEDURE — 99214 OFFICE O/P EST MOD 30 MIN: CPT | Performed by: INTERNAL MEDICINE

## 2019-10-25 PROCEDURE — 4040F PNEUMOC VAC/ADMIN/RCVD: CPT | Performed by: INTERNAL MEDICINE

## 2019-10-25 PROCEDURE — 3017F COLORECTAL CA SCREEN DOC REV: CPT | Performed by: INTERNAL MEDICINE

## 2019-10-25 PROCEDURE — G8417 CALC BMI ABV UP PARAM F/U: HCPCS | Performed by: INTERNAL MEDICINE

## 2019-10-25 PROCEDURE — G8400 PT W/DXA NO RESULTS DOC: HCPCS | Performed by: INTERNAL MEDICINE

## 2019-10-25 PROCEDURE — G8598 ASA/ANTIPLAT THER USED: HCPCS | Performed by: INTERNAL MEDICINE

## 2019-10-25 PROCEDURE — G8484 FLU IMMUNIZE NO ADMIN: HCPCS | Performed by: INTERNAL MEDICINE

## 2019-10-25 PROCEDURE — 1123F ACP DISCUSS/DSCN MKR DOCD: CPT | Performed by: INTERNAL MEDICINE

## 2019-10-25 PROCEDURE — 1036F TOBACCO NON-USER: CPT | Performed by: INTERNAL MEDICINE

## 2019-10-25 RX ORDER — ASPIRIN/CALCIUM/MAG/ALUMINUM 325 MG
TABLET ORAL
COMMUNITY

## 2020-03-26 RX ORDER — RANOLAZINE 500 MG/1
500 TABLET, EXTENDED RELEASE ORAL 2 TIMES DAILY
Qty: 180 TABLET | Refills: 3 | Status: SHIPPED | OUTPATIENT
Start: 2020-03-26 | End: 2020-07-13

## 2020-04-17 ENCOUNTER — TELEPHONE (OUTPATIENT)
Dept: CARDIOLOGY CLINIC | Age: 75
End: 2020-04-17

## 2020-04-20 RX ORDER — DILTIAZEM HYDROCHLORIDE 120 MG/1
120 CAPSULE, COATED, EXTENDED RELEASE ORAL DAILY
Qty: 90 CAPSULE | Refills: 1 | Status: SHIPPED | OUTPATIENT
Start: 2020-04-20 | End: 2020-10-12

## 2020-04-20 RX ORDER — CLOPIDOGREL BISULFATE 75 MG/1
75 TABLET ORAL DAILY
Qty: 90 TABLET | Refills: 1 | Status: SHIPPED | OUTPATIENT
Start: 2020-04-20 | End: 2020-05-20

## 2020-04-29 ENCOUNTER — OFFICE VISIT (OUTPATIENT)
Dept: CARDIOLOGY CLINIC | Age: 75
End: 2020-04-29
Payer: MEDICARE

## 2020-04-29 VITALS
TEMPERATURE: 98.3 F | WEIGHT: 187 LBS | HEIGHT: 61 IN | HEART RATE: 62 BPM | BODY MASS INDEX: 35.3 KG/M2 | SYSTOLIC BLOOD PRESSURE: 102 MMHG | DIASTOLIC BLOOD PRESSURE: 52 MMHG | OXYGEN SATURATION: 95 %

## 2020-04-29 PROCEDURE — 99214 OFFICE O/P EST MOD 30 MIN: CPT | Performed by: INTERNAL MEDICINE

## 2020-04-29 PROCEDURE — 1090F PRES/ABSN URINE INCON ASSESS: CPT | Performed by: INTERNAL MEDICINE

## 2020-04-29 PROCEDURE — 3017F COLORECTAL CA SCREEN DOC REV: CPT | Performed by: INTERNAL MEDICINE

## 2020-04-29 PROCEDURE — G8427 DOCREV CUR MEDS BY ELIG CLIN: HCPCS | Performed by: INTERNAL MEDICINE

## 2020-04-29 PROCEDURE — G8400 PT W/DXA NO RESULTS DOC: HCPCS | Performed by: INTERNAL MEDICINE

## 2020-04-29 PROCEDURE — 1036F TOBACCO NON-USER: CPT | Performed by: INTERNAL MEDICINE

## 2020-04-29 PROCEDURE — G8417 CALC BMI ABV UP PARAM F/U: HCPCS | Performed by: INTERNAL MEDICINE

## 2020-04-29 PROCEDURE — 4040F PNEUMOC VAC/ADMIN/RCVD: CPT | Performed by: INTERNAL MEDICINE

## 2020-04-29 PROCEDURE — 1123F ACP DISCUSS/DSCN MKR DOCD: CPT | Performed by: INTERNAL MEDICINE

## 2020-04-29 RX ORDER — ATENOLOL 50 MG/1
50 TABLET ORAL DAILY
COMMUNITY

## 2020-04-29 RX ORDER — RAMIPRIL 5 MG/1
5 CAPSULE ORAL DAILY
Qty: 90 CAPSULE | Refills: 3 | Status: SHIPPED | OUTPATIENT
Start: 2020-04-29 | End: 2021-03-29

## 2020-04-29 RX ORDER — POTASSIUM CHLORIDE 20 MEQ/1
20 TABLET, EXTENDED RELEASE ORAL DAILY
COMMUNITY

## 2020-04-29 NOTE — PROGRESS NOTES
[facility]   Cardiac Evaluation      Patient: Vernia Oppenheim  YOB: 1945  Date: 20     Chief Complaint   Patient presents with    Hypertension    Hyperlipidemia    Tachycardia      Referring provider: Avni Chavarria (Inactive)    History of Present Illness:   Ms. Charmaine Salas underwent CABG  with LIMA-LAD, left radial graft extended as T-graft from LIMA to diagonal and PDA. She had c/o chest pain, then abnormal GXT which led to cath showing progressive disease of the LCX with collaterals but no intervention 3/17. She was placed on Ranexa for chest pain. She has hypertension and hyperlipidemia. Today, she is here for regular follow up. Overall, she feels well. She denies exertional chest pain, LUEVANO/PND, palpitations, light-headedness/dizziness, edema. She is active, does not smoke. Orelia Awkward walks for exercise. States pcp stopped Metformin because her A1C was low. Past Medical History:   has a past medical history of CAD (coronary artery disease), Diabetes, Hyperlipidemia, Hypertension, Leg edema, and Obesity. Surgical History:   has a past surgical history that includes Cardiac catheterization and Coronary artery bypass graft. Social History:   reports that she has never smoked. She does not have any smokeless tobacco history on file. She reports that she drinks alcohol. 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 Her granddaughter has been treated for Crohns and has been diagnosed with lymphoma (in remission currently). Her   suddenly of probable heart disease. Family History:  family history includes Heart Disease in her father.      Allergies:  Lipitor [atorvastatin]     Current Outpatient Medications on File Prior to Visit   Medication Sig Dispense Refill    atenolol (TENORMIN) 50 MG tablet Take 50 mg by mouth daily      potassium chloride (KLOR-CON M20) 20 MEQ normal gait balance and coordination    Assessment:    1. CAD /CABG 2003 (LIMA-LAD, RA T-graft off LIMA to diagonal and posterior descending artery) : Stable, no anginal symptoms  Occasional chest pain w/ exertion, none recently, takes Ranexa    -CATH 3/17 (after abnormal GXT)> LVEDP - 6, normal WM with EF 60%  Cors:LM - nl, LAD - 95% prox and mid; mid and distal vessel fills by LIMA, LCX - 100% prox with collateral filling of OM1 and the distal LCX by L-L collaterals. RCA - dominant and with irreg throughout; PDA - 50% mid. [address] - LAD patent, Y-RA from LIMA to DX patent, occluded to PDA  Plan: medical therapy. MV defect from occluded LCX with collaterals  -GXT abel 10/11> normal myocardial perfusion with EF 67%  -GXT abel 10/08> normal myocardial perfusion, runs of atrial fibrillation with RVR    2. Other and unspecified hyperlipidemia: Stable on Pravachol 20mg. Labs 11/18/19: ; TRIG 221; HDL 47; LDL 72  Will recheck thru PCP   3. Essential hypertension, benign:  lower   4. Paroxysmal supraventricular tachycardia: Rate controlled on diltiazem. 5.  Carotid stenosis: Stable  Doppler 4/5/19> less than 50% bilateral  Doppler 3/18/16> 48-67% KAROL, 3-69% LICA  Doppler 4/5/46> 63-22% KAROL, 2-36% LICA    Plan: Decrease Altace to 5mg daily due to lower BPs. . Continue regular exercise. FU 6 months. Thank you for allowing me to participate in the care of Sunita Reed. Scribe's attestation: This note was scribed in the presence of Dr Sylvester Flores MD by Stoney Biswas, RYDER. The scribe's documentation has been prepared under my direction and personally reviewed by me in its entirety. I confirm that the note above accurately reflects all work, treatment, procedures, and medical decision making performed by me.

## 2020-05-20 RX ORDER — CLOPIDOGREL BISULFATE 75 MG/1
75 TABLET ORAL DAILY
Qty: 90 TABLET | Refills: 3 | Status: SHIPPED | OUTPATIENT
Start: 2020-05-20 | End: 2021-04-13

## 2020-07-13 RX ORDER — RANOLAZINE 500 MG/1
TABLET, EXTENDED RELEASE ORAL
Qty: 14 TABLET | Refills: 0 | Status: SHIPPED | OUTPATIENT
Start: 2020-07-13 | End: 2020-07-13 | Stop reason: SDUPTHER

## 2020-07-13 NOTE — TELEPHONE ENCOUNTER
Refill was requested from pharmacy. Patient is up to date with appointments and lab work. Pt needed a 3 day supply to go to [facility] then a 90 day supply to [facility].

## 2020-07-14 RX ORDER — RANOLAZINE 500 MG/1
TABLET, EXTENDED RELEASE ORAL
Qty: 180 TABLET | Refills: 3 | Status: SHIPPED | OUTPATIENT
Start: 2020-07-14 | End: 2021-05-10

## 2020-10-12 RX ORDER — DILTIAZEM HYDROCHLORIDE 120 MG/1
120 CAPSULE, COATED, EXTENDED RELEASE ORAL DAILY
Qty: 90 CAPSULE | Refills: 1 | Status: SHIPPED | OUTPATIENT
Start: 2020-10-12 | End: 2021-03-29

## 2020-10-28 ENCOUNTER — OFFICE VISIT (OUTPATIENT)
Dept: CARDIOLOGY CLINIC | Age: 75
End: 2020-10-28
Payer: MEDICARE

## 2020-10-28 VITALS
DIASTOLIC BLOOD PRESSURE: 70 MMHG | HEIGHT: 61 IN | WEIGHT: 200 LBS | BODY MASS INDEX: 37.76 KG/M2 | HEART RATE: 76 BPM | SYSTOLIC BLOOD PRESSURE: 126 MMHG | OXYGEN SATURATION: 96 %

## 2020-10-28 PROCEDURE — G8484 FLU IMMUNIZE NO ADMIN: HCPCS | Performed by: INTERNAL MEDICINE

## 2020-10-28 PROCEDURE — 93000 ELECTROCARDIOGRAM COMPLETE: CPT | Performed by: INTERNAL MEDICINE

## 2020-10-28 PROCEDURE — 3017F COLORECTAL CA SCREEN DOC REV: CPT | Performed by: INTERNAL MEDICINE

## 2020-10-28 PROCEDURE — G8417 CALC BMI ABV UP PARAM F/U: HCPCS | Performed by: INTERNAL MEDICINE

## 2020-10-28 PROCEDURE — 1123F ACP DISCUSS/DSCN MKR DOCD: CPT | Performed by: INTERNAL MEDICINE

## 2020-10-28 PROCEDURE — 4040F PNEUMOC VAC/ADMIN/RCVD: CPT | Performed by: INTERNAL MEDICINE

## 2020-10-28 PROCEDURE — 1090F PRES/ABSN URINE INCON ASSESS: CPT | Performed by: INTERNAL MEDICINE

## 2020-10-28 PROCEDURE — G8427 DOCREV CUR MEDS BY ELIG CLIN: HCPCS | Performed by: INTERNAL MEDICINE

## 2020-10-28 PROCEDURE — 1036F TOBACCO NON-USER: CPT | Performed by: INTERNAL MEDICINE

## 2020-10-28 PROCEDURE — 99214 OFFICE O/P EST MOD 30 MIN: CPT | Performed by: INTERNAL MEDICINE

## 2020-10-28 PROCEDURE — G8400 PT W/DXA NO RESULTS DOC: HCPCS | Performed by: INTERNAL MEDICINE

## 2020-10-28 RX ORDER — ZINC SULFATE 50(220)MG
50 CAPSULE ORAL DAILY
COMMUNITY

## 2020-10-28 NOTE — LETTER
[facility] [address]  Phone: [phone]  Fax: [phone]    Rayna Craft MD        October 29, 2020     Counts include 234 beds at the [facility] (Inactive)  No address on file    Patient: Maybelle Sacks  MR Number: [id number]  YOB: 1945  Date of Visit: 10/28/2020    Dear Dr. Sacha Acosta (Inactive):    [address]   Cardiac Evaluation      Patient: Maybelle Sacks  YOB: 1945  Date: 10/28/20     Chief Complaint   Patient presents with    Hyperlipidemia    Hypertension      Referring provider: Sacha Acosta (Inactive)    History of Present Illness:   Ms. Aaron Andrew underwent CABG 2/03 with LIMA-LAD, left radial graft extended as T-graft from LIMA to diagonal and PDA. She had c/o chest pain, then abnormal GXT which led to cath showing progressive disease of the LCX with collaterals but no intervention 3/17. She was placed on Ranexa for chest pain. She has hypertension, diabetes, and hyperlipidemia. Today, she is here for regular follow up. She states she has been fine. Chanell Music denies any chest pain, palpitations, LUEVANO, dizziness, or edema. She walks, some, for exercise. It appears she has gained some weight. Past Medical History:   has a past medical history of CAD (coronary artery disease), Diabetes, Hyperlipidemia, Hypertension, Leg edema, and Obesity. Surgical History:   has a past surgical history that includes Cardiac catheterization and Coronary artery bypass graft. Social History:   reports that she has never smoked. She does not have any smokeless tobacco history on file. She reports that she drinks alcohol. 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 Her granddaughter has been treated for Crohns and has been diagnosed with lymphoma (in remission currently).   Her   suddenly of probable heart disease. Family History:  family history includes Heart Disease in her father. Allergies:  Lipitor [atorvastatin]     Current Outpatient Medications on File Prior to Visit   Medication Sig Dispense Refill    zinc sulfate (ZINCATE) 220 (50 Zn) MG capsule Take 50 mg by mouth daily      dilTIAZem (CARDIZEM CD) 120 MG extended release capsule TAKE 1 CAPSULE BY MOUTH DAILY 90 capsule 1    ranolazine (RANEXA) 500 MG extended release tablet TAKE 1 TABLET BY MOUTH TWO  TIMES DAILY 180 tablet 3    clopidogrel (PLAVIX) 75 MG tablet TAKE 1 TABLET BY MOUTH  DAILY 90 tablet 3    atenolol (TENORMIN) 50 MG tablet Take 50 mg by mouth daily      potassium chloride (KLOR-CON M20) 20 MEQ extended release tablet Take 20 mEq by mouth daily      ramipril (ALTACE) 5 MG capsule Take 1 capsule by mouth daily 90 capsule 3    Aspirin Buf,[id number], (BUFFERED ASPIRIN) 325 MG TABS Take by mouth      furosemide (LASIX) 40 MG tablet Take 1 tablet by mouth 2 times daily (Patient taking differently: Take 40 mg by mouth daily ) 60 tablet 5    vitamin D (CHOLECALCIFEROL) 1000 UNIT TABS tablet Take 1,000 Units by mouth daily      Calcium Carb-Cholecalciferol (CALCIUM 500+D PO) Take by mouth      loperamide (IMODIUM) 2 MG capsule Take 2 mg by mouth daily       pravastatin (PRAVACHOL) 20 MG tablet Take 20 mg by mouth daily       levothyroxine (SYNTHROID) 100 MCG tablet Take 100 mcg by mouth daily       loratadine (CLARITIN) 10 MG tablet Take 10 mg by mouth daily.  folic acid (FOLVITE) 1 MG tablet Take 1 mg by mouth daily.  ezetimibe (ZETIA) 10 MG tablet Take 10 mg by mouth daily.  multivitamin (THERAGRAN) per tablet Take 1 tablet by mouth daily.         nitroGLYCERIN (NITROSTAT) 0.4 MG SL tablet Place 1 tablet under the tongue every 5 minutes as needed for Chest pain 25 tablet 3    calcium carbonate (OSCAL) 500 MG TABS tablet Take 1,000 mg by mouth daily No current facility-administered medications on file prior to visit. Review of Systems:   · Constitutional: there has been no unanticipated weight loss. No change in energy or activity level   · Eyes: No visual changes   · ENT: No Headaches, hearing loss or vertigo. No mouth sores or sore throat. · Cardiovascular: Reviewed in HPI  · Respiratory: No cough or wheezing, no sputum production. No hematemesis. · Gastrointestinal: No abdominal pain, appetite loss, blood in stools. No change in bowel or bladder habits. · Genitourinary: No nocturia, dysuria, trouble voiding  · Musculoskeletal:  No gait disturbance, weakness or joint complaints. · Integumentary: No rash or pruritis. · Neurological: No headache, change in muscle strength, numbness or tingling. No change in gait, balance, coordination, mood, affect, memory, mentation, behavior. · Psychiatric: No anxiety or depression  · Endocrine: No malaise or fever  · Hematologic/Lymphatic: No abnormal bruising or bleeding, blood clots or swollen lymph nodes. · Allergic/Immunologic: No nasal congestion or hives.     Physical Examination:    Vitals:    10/28/20 1042   BP: 126/70   Site: Left Upper Arm   Position: Sitting   Cuff Size: Large Adult   Pulse: 96   Weight: 200 lb (90.7 kg)   Height: 5' 1\" (1.549 m)     Wt Readings from Last 3 Encounters:   10/28/20 200 lb (90.7 kg)   04/29/20 187 lb (84.8 kg)   10/25/19 185 lb (83.9 kg)     BP Readings from Last 3 Encounters:   10/28/20 126/70   04/29/20 (!) 102/52   10/25/19 114/70     Constitutional and General Appearance:  appears stated age  Respiratory:  · Normal excursion and expansion without use of accessory muscles  · Resp Auscultation: Normal breath sounds without dullness  Cardiovascular:  · The apical impulses not displaced  · Heart is regular rate and rhythm with normal S1, S2, short 1/6 STELLA  · The carotid upstroke is normal, bruit noted   · JVP is not elevated  · Peripheral pulses are symmetrical · There is no clubbing, cyanosis of the extremities  · No edema. Starting with venous stasis skin changes  · Femoral Arteries: 2+ and equal  · Pedal Pulses: 2+ and equal   Abdomen:  · No masses or tenderness  · Normal bowel sounds  Neurological/Psychiatric:  · Alert and oriented x3  · Moves all extremities well, cath site stable  · Exhibits normal gait balance and coordination    Assessment:  EKG>sinus rhythm, T wave inversion (unchanged)    1. CAD /CABG 2003 (LIMA-LAD, RA T-graft off LIMA to diagonal and posterior descending artery) : Stable, no anginal symptoms  -CATH 3/17 (after abnormal GXT)> LVEDP - 6, normal WM with EF 60%  Cors:LM - nl, LAD - 95% prox and mid; mid and distal vessel fills by LIMA, LCX - 100% prox with collateral filling of OM1 and the distal LCX by L-L collaterals. RCA - dominant and with irreg throughout; PDA - 50% mid. [address] - LAD patent, Y-RA from LIMA to DX patent, occluded to PDA  Plan: medical therapy. MV defect from occluded LCX with collaterals  -GXT abel 10/11> normal myocardial perfusion with EF 67%  -GXT abel 10/08> normal myocardial perfusion, runs of atrial fibrillation with RVR    2. Other and unspecified hyperlipidemia: Stable on Pravachol 20mg. Labs 11/18/19: ; TRIG 221; HDL 47; LDL 72  Will recheck thru PCP    3. Essential hypertension, benign:  Stable    4. Paroxysmal supraventricular tachycardia: Rate controlled on diltiazem. 5.  Carotid stenosis: Stable  Doppler 4/5/19> less than 50% bilateral  Doppler 3/18/16> 26-37% KAROL, 4-24% LICA  Doppler 5/4/61> 39-41% KAROL, 7-30% LICA    Plan:  Marlene Sharma appears stable. No med changes. Encouraged to work on weight loss. FU 6 months. Thank you for allowing me to participate in the care of Inez Mattson. Scribe's attestation: This note was scribed in the presence of Dr Stephen Sin MD by Anette Hughes, RYDER. The scribe's documentation has been prepared under my direction and personally reviewed by me in its entirety.  I confirm that the note above accurately reflects all work, treatment, procedures, and medical decision making performed by me. If you have questions, please do not hesitate to call me. I look forward to following Tamara Figueredo along with you.     Sincerely,        Gino Nuñez MD

## 2020-10-28 NOTE — PROGRESS NOTES
[address]   Cardiac Evaluation      Patient: Inez Mattson  YOB: 1945  Date: 10/28/20     Chief Complaint   Patient presents with    Hyperlipidemia    Hypertension      Referring provider: Aimee Back (Inactive)    History of Present Illness:   Ms. Melony Cooks underwent CABG  with LIMA-LAD, left radial graft extended as T-graft from LIMA to diagonal and PDA. She had c/o chest pain, then abnormal GXT which led to cath showing progressive disease of the LCX with collaterals but no intervention 3/17. She was placed on Ranexa for chest pain. She has hypertension, diabetes, and hyperlipidemia. Today, she is here for regular follow up. She states she has been fine. Marlene Sharma denies any chest pain, palpitations, LUEVANO, dizziness, or edema. She walks, some, for exercise. It appears she has gained some weight. Past Medical History:   has a past medical history of CAD (coronary artery disease), Diabetes, Hyperlipidemia, Hypertension, Leg edema, and Obesity. Surgical History:   has a past surgical history that includes Cardiac catheterization and Coronary artery bypass graft. Social History:   reports that she has never smoked. She does not have any smokeless tobacco history on file. She reports that she drinks alcohol. 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 Her granddaughter has been treated for Crohns and has been diagnosed with lymphoma (in remission currently). Her   suddenly of probable heart disease. Family History:  family history includes Heart Disease in her father.      Allergies:  Lipitor [atorvastatin]     Current Outpatient Medications on File Prior to Visit   Medication Sig Dispense Refill    zinc sulfate (ZINCATE) 220 (50 Zn) MG capsule Take 50 mg by mouth daily      dilTIAZem (CARDIZEM CD) 120 MG extended release capsule TAKE 1 CAPSULE BY MOUTH DAILY 90 capsule 1  ranolazine (RANEXA) 500 MG extended release tablet TAKE 1 TABLET BY MOUTH TWO  TIMES DAILY 180 tablet 3    clopidogrel (PLAVIX) 75 MG tablet TAKE 1 TABLET BY MOUTH  DAILY 90 tablet 3    atenolol (TENORMIN) 50 MG tablet Take 50 mg by mouth daily      potassium chloride (KLOR-CON M20) 20 MEQ extended release tablet Take 20 mEq by mouth daily      ramipril (ALTACE) 5 MG capsule Take 1 capsule by mouth daily 90 capsule 3    Aspirin Buf,[id number], (BUFFERED ASPIRIN) 325 MG TABS Take by mouth      furosemide (LASIX) 40 MG tablet Take 1 tablet by mouth 2 times daily (Patient taking differently: Take 40 mg by mouth daily ) 60 tablet 5    vitamin D (CHOLECALCIFEROL) 1000 UNIT TABS tablet Take 1,000 Units by mouth daily      Calcium Carb-Cholecalciferol (CALCIUM 500+D PO) Take by mouth      loperamide (IMODIUM) 2 MG capsule Take 2 mg by mouth daily       pravastatin (PRAVACHOL) 20 MG tablet Take 20 mg by mouth daily       levothyroxine (SYNTHROID) 100 MCG tablet Take 100 mcg by mouth daily       loratadine (CLARITIN) 10 MG tablet Take 10 mg by mouth daily.  folic acid (FOLVITE) 1 MG tablet Take 1 mg by mouth daily.  ezetimibe (ZETIA) 10 MG tablet Take 10 mg by mouth daily.  multivitamin (THERAGRAN) per tablet Take 1 tablet by mouth daily.  nitroGLYCERIN (NITROSTAT) 0.4 MG SL tablet Place 1 tablet under the tongue every 5 minutes as needed for Chest pain 25 tablet 3    calcium carbonate (OSCAL) 500 MG TABS tablet Take 1,000 mg by mouth daily       No current facility-administered medications on file prior to visit. Review of Systems:   · Constitutional: there has been no unanticipated weight loss. No change in energy or activity level   · Eyes: No visual changes   · ENT: No Headaches, hearing loss or vertigo. No mouth sores or sore throat. · Cardiovascular: Reviewed in HPI  · Respiratory: No cough or wheezing, no sputum production. No hematemesis. inversion (unchanged)    1. CAD /CABG 2003 (LIMA-LAD, RA T-graft off LIMA to diagonal and posterior descending artery) : Stable, no anginal symptoms  -CATH 3/17 (after abnormal GXT)> LVEDP - 6, normal WM with EF 60%  Cors:LM - nl, LAD - 95% prox and mid; mid and distal vessel fills by LIMA, LCX - 100% prox with collateral filling of OM1 and the distal LCX by L-L collaterals. RCA - dominant and with irreg throughout; PDA - 50% mid. [address] - LAD patent, Y-RA from LIMA to DX patent, occluded to PDA  Plan: medical therapy. MV defect from occluded LCX with collaterals  -GXT abel 10/11> normal myocardial perfusion with EF 67%  -GXT abel 10/08> normal myocardial perfusion, runs of atrial fibrillation with RVR    2. Other and unspecified hyperlipidemia: Stable on Pravachol 20mg. Labs 11/18/19: ; TRIG 221; HDL 47; LDL 72  Will recheck thru PCP    3. Essential hypertension, benign:  Stable    4. Paroxysmal supraventricular tachycardia: Rate controlled on diltiazem. 5.  Carotid stenosis: Stable  Doppler 4/5/19> less than 50% bilateral  Doppler 3/18/16> 38-53% KAROL, 7-78% LICA  Doppler 6/1/62> 06-56% KAROL, 7-29% LICA    Plan:  Shree Raymundo appears stable. No med changes. Encouraged to work on weight loss. FU 6 months. Thank you for allowing me to participate in the care of Lavona Schaumann. Scribe's attestation: This note was scribed in the presence of Dr Rudine Nyhan MD by Abdullahi Ervin, RYDER. The scribe's documentation has been prepared under my direction and personally reviewed by me in its entirety. I confirm that the note above accurately reflects all work, treatment, procedures, and medical decision making performed by me.

## 2021-03-15 ENCOUNTER — TELEPHONE (OUTPATIENT)
Dept: CARDIOLOGY CLINIC | Age: 76
End: 2021-03-15

## 2021-03-15 DIAGNOSIS — E83.42 HYPOMAGNESEMIA: ICD-10-CM

## 2021-03-15 DIAGNOSIS — I10 ESSENTIAL HYPERTENSION, BENIGN: Primary | ICD-10-CM

## 2021-03-15 RX ORDER — NITROGLYCERIN 0.4 MG/1
TABLET SUBLINGUAL
Qty: 25 TABLET | Refills: 3 | Status: SHIPPED | OUTPATIENT
Start: 2021-03-15 | End: 2022-04-01

## 2021-03-15 NOTE — TELEPHONE ENCOUNTER
EUGENIA    Spoke to pt she will F/U with her PCP for low potassium and magnesium. She has an apt in April for a regular F/U with cardiology. I let her know Dr. Steven Hodge will review her records and will call her if any changes should be made.

## 2021-03-15 NOTE — TELEPHONE ENCOUNTER
Pt states she was having Irregular HR and elevated BP. Pt went to [address]  labs showed low potassium and magnesium. Please call pt to advise.

## 2021-03-24 NOTE — TELEPHONE ENCOUNTER
Spoke w/ pt. She went to [facility] 3/12/21 with PVC's. Extra K+ was added for 7 days and Dr Zohreh Mix has increased her K+ to 20meq BID. She has been doubling K+ for approx 2 weeks. She does not have orders for repeat labs. She states she feels fine now. I advised her to repeat BMP per Dr Sudhakar Rapp and let her know to call if any questions/concerns and will move her appt up. She verbalized understanding.

## 2021-03-29 RX ORDER — DILTIAZEM HYDROCHLORIDE 120 MG/1
120 CAPSULE, COATED, EXTENDED RELEASE ORAL DAILY
Qty: 90 CAPSULE | Refills: 3 | Status: SHIPPED | OUTPATIENT
Start: 2021-03-29 | End: 2021-06-03

## 2021-03-29 RX ORDER — RAMIPRIL 5 MG/1
5 CAPSULE ORAL DAILY
Qty: 90 CAPSULE | Refills: 3 | Status: SHIPPED | OUTPATIENT
Start: 2021-03-29 | End: 2022-01-04

## 2021-04-05 ENCOUNTER — TELEPHONE (OUTPATIENT)
Dept: CARDIOLOGY CLINIC | Age: 76
End: 2021-04-05

## 2021-04-05 NOTE — TELEPHONE ENCOUNTER
Patient told Dr Danielle Quiroz that she had labs drawn last week at either [facility] or [facility] . Dr Danielle Quiroz would like the results of her labs faxed to

## 2021-04-13 RX ORDER — CLOPIDOGREL BISULFATE 75 MG/1
75 TABLET ORAL DAILY
Qty: 90 TABLET | Refills: 3 | Status: SHIPPED | OUTPATIENT
Start: 2021-04-13 | End: 2022-03-10

## 2021-04-28 ENCOUNTER — OFFICE VISIT (OUTPATIENT)
Dept: CARDIOLOGY CLINIC | Age: 76
End: 2021-04-28
Payer: MEDICARE

## 2021-04-28 VITALS
DIASTOLIC BLOOD PRESSURE: 66 MMHG | SYSTOLIC BLOOD PRESSURE: 120 MMHG | HEIGHT: 60 IN | HEART RATE: 61 BPM | WEIGHT: 197 LBS | OXYGEN SATURATION: 98 % | BODY MASS INDEX: 38.68 KG/M2

## 2021-04-28 DIAGNOSIS — I25.10 ATHEROSCLEROSIS OF NATIVE CORONARY ARTERY OF NATIVE HEART WITHOUT ANGINA PECTORIS: ICD-10-CM

## 2021-04-28 DIAGNOSIS — I10 ESSENTIAL HYPERTENSION, BENIGN: ICD-10-CM

## 2021-04-28 DIAGNOSIS — I47.1 PAROXYSMAL SUPRAVENTRICULAR TACHYCARDIA (HCC): Primary | ICD-10-CM

## 2021-04-28 DIAGNOSIS — E78.5 HYPERLIPIDEMIA, UNSPECIFIED HYPERLIPIDEMIA TYPE: ICD-10-CM

## 2021-04-28 PROCEDURE — G8427 DOCREV CUR MEDS BY ELIG CLIN: HCPCS | Performed by: INTERNAL MEDICINE

## 2021-04-28 PROCEDURE — G8400 PT W/DXA NO RESULTS DOC: HCPCS | Performed by: INTERNAL MEDICINE

## 2021-04-28 PROCEDURE — 99214 OFFICE O/P EST MOD 30 MIN: CPT | Performed by: INTERNAL MEDICINE

## 2021-04-28 PROCEDURE — 3017F COLORECTAL CA SCREEN DOC REV: CPT | Performed by: INTERNAL MEDICINE

## 2021-04-28 PROCEDURE — 4040F PNEUMOC VAC/ADMIN/RCVD: CPT | Performed by: INTERNAL MEDICINE

## 2021-04-28 PROCEDURE — 1036F TOBACCO NON-USER: CPT | Performed by: INTERNAL MEDICINE

## 2021-04-28 PROCEDURE — 1090F PRES/ABSN URINE INCON ASSESS: CPT | Performed by: INTERNAL MEDICINE

## 2021-04-28 PROCEDURE — 1123F ACP DISCUSS/DSCN MKR DOCD: CPT | Performed by: INTERNAL MEDICINE

## 2021-04-28 PROCEDURE — G8417 CALC BMI ABV UP PARAM F/U: HCPCS | Performed by: INTERNAL MEDICINE

## 2021-04-28 NOTE — PROGRESS NOTES
[address]   Cardiac Evaluation      Patient: Michael Paulino  YOB: 1945  Date: 21     Chief Complaint   Patient presents with    Coronary Artery Disease    Hyperlipidemia    Hypertension      Referring provider: Sima Mosqueda (Inactive)    History of Present Illness:   Ms. Monica Herbert underwent CABG  with LIMA-LAD, left radial graft extended as T-graft from LIMA to diagonal and PDA. She had c/o chest pain, then abnormal GXT which led to cath showing progressive disease of the LCX with collaterals but no intervention 3/17. She was placed on Ranexa for chest pain. She has hypertension, diabetes, and hyperlipidemia. Today, she is here for regular follow up. She had an episode of PVC's in March and went to ER. Her K+ and Mag were low. She is now taking K+ BID and K+ has normalized. She denies any chest pain, palpitations, LUEVANO, dizziness. Julia Betts does have edema in her feet that comes and goes. Past Medical History:   has a past medical history of CAD (coronary artery disease), Diabetes, Hyperlipidemia, Hypertension, Leg edema, and Obesity. Surgical History:   has a past surgical history that includes Cardiac catheterization and Coronary artery bypass graft. Social History:   reports that she has never smoked. She does not have any smokeless tobacco history on file. She reports that she drinks alcohol. 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 Her granddaughter has been treated for Crohns and has been diagnosed with lymphoma (in remission currently). Her   suddenly of probable heart disease. Family History:  family history includes Heart Disease in her father.      Allergies:  Lipitor [atorvastatin]     Current Outpatient Medications on File Prior to Visit   Medication Sig Dispense Refill    clopidogrel (PLAVIX) 75 MG tablet TAKE 1 TABLET BY MOUTH  DAILY 90 tablet 3    ramipril (ALTACE) 5 MG capsule TAKE 1 CAPSULE BY MOUTH DAILY 90 capsule 3    dilTIAZem (CARDIZEM CD) 120 MG extended release capsule TAKE 1 CAPSULE BY MOUTH DAILY 90 capsule 3    zinc sulfate (ZINCATE) 220 (50 Zn) MG capsule Take 50 mg by mouth daily      ranolazine (RANEXA) 500 MG extended release tablet TAKE 1 TABLET BY MOUTH TWO  TIMES DAILY 180 tablet 3    atenolol (TENORMIN) 50 MG tablet Take 50 mg by mouth daily      potassium chloride (KLOR-CON M20) 20 MEQ extended release tablet Take 20 mEq by mouth 2 times daily       furosemide (LASIX) 40 MG tablet Take 1 tablet by mouth 2 times daily (Patient taking differently: Take 40 mg by mouth daily ) 60 tablet 5    calcium carbonate (OSCAL) 500 MG TABS tablet Take 1,000 mg by mouth daily      vitamin D (CHOLECALCIFEROL) 1000 UNIT TABS tablet Take 1,000 Units by mouth daily      Calcium Carb-Cholecalciferol (CALCIUM 500+D PO) Take by mouth      loperamide (IMODIUM) 2 MG capsule Take 2 mg by mouth daily       pravastatin (PRAVACHOL) 20 MG tablet Take 20 mg by mouth daily       levothyroxine (SYNTHROID) 100 MCG tablet Take 100 mcg by mouth daily       loratadine (CLARITIN) 10 MG tablet Take 10 mg by mouth daily.  folic acid (FOLVITE) 1 MG tablet Take 1 mg by mouth daily.  ezetimibe (ZETIA) 10 MG tablet Take 10 mg by mouth daily.  multivitamin (THERAGRAN) per tablet Take 1 tablet by mouth daily.  nitroGLYCERIN (NITROSTAT) 0.4 MG SL tablet PLACE 1 TABLET UNDER THE TONGUE EVERY 5 MINUTES AS NEEDED FOR CHEST PAINS 25 tablet 3    Aspirin Buf,[id number], (BUFFERED ASPIRIN) 325 MG TABS Take by mouth       No current facility-administered medications on file prior to visit. Review of Systems:   · Constitutional: there has been no unanticipated weight loss. No change in energy or activity level   · Eyes: No visual changes   · ENT: No Headaches, hearing loss or vertigo. No mouth sores or sore throat.   · Cardiovascular: Reviewed in HPI  · Respiratory: No cough or wheezing, no sputum production. No hematemesis. · Gastrointestinal: No abdominal pain, appetite loss, blood in stools. No change in bowel or bladder habits. · Genitourinary: No nocturia, dysuria, trouble voiding  · Musculoskeletal:  No gait disturbance, weakness or joint complaints. · Integumentary: No rash or pruritis. · Neurological: No headache, change in muscle strength, numbness or tingling. No change in gait, balance, coordination, mood, affect, memory, mentation, behavior. · Psychiatric: No anxiety or depression  · Endocrine: No malaise or fever  · Hematologic/Lymphatic: No abnormal bruising or bleeding, blood clots or swollen lymph nodes. · Allergic/Immunologic: No nasal congestion or hives. Physical Examination:    Vitals:    04/28/21 1055   BP: 120/66   Site: Left Upper Arm   Position: Sitting   Cuff Size: Large Adult   Pulse: 61   SpO2: 98%   Weight: 197 lb (89.4 kg)   Height: 5' (1.524 m)     Wt Readings from Last 3 Encounters:   04/28/21 197 lb (89.4 kg)   10/28/20 200 lb (90.7 kg)   04/29/20 187 lb (84.8 kg)     BP Readings from Last 3 Encounters:   04/28/21 120/66   10/28/20 126/70   04/29/20 (!) 102/52     Constitutional and General Appearance:  appears stated age  Respiratory:  · Normal excursion and expansion without use of accessory muscles  · Resp Auscultation: Normal breath sounds without dullness  Cardiovascular:  · The apical impulses not displaced  · Heart is regular rate and rhythm with normal S1, S2, short 1/6 STELLA  · The carotid upstroke is normal, bruit noted   · JVP is not elevated  · Peripheral pulses are symmetrical  · There is no clubbing, cyanosis of the extremities  · No edema.  Starting with venous stasis skin changes  · Femoral Arteries: 2+ and equal  · Pedal Pulses: 2+ and equal   Abdomen:  · No masses or tenderness  · Normal bowel sounds  Neurological/Psychiatric:  · Alert and oriented x3  · Moves all extremities well, cath site stable  · Exhibits normal gait balance and coordination    Assessment:      1. CAD /CABG 2003 (LIMA-LAD, RA T-graft off LIMA to diagonal and posterior descending artery) : Stable, no anginal symptoms  -CATH 3/17 (after abnormal GXT)> LVEDP - 6, normal WM with EF 60%  Cors:LM - nl, LAD - 95% prox and mid; mid and distal vessel fills by LIMA, LCX - 100% prox with collateral filling of OM1 and the distal LCX by L-L collaterals. RCA - dominant and with irreg throughout; PDA - 50% mid. [address] - LAD patent, Y-RA from LIMA to DX patent, occluded to PDA  Plan: medical therapy. MV defect from occluded LCX with collaterals  -GXT abel 10/11> normal myocardial perfusion with EF 67%  -GXT abel 10/08> normal myocardial perfusion, runs of atrial fibrillation with RVR    2. Other and unspecified hyperlipidemia: Stable on Pravachol 20mg. Labs 3/17/21: ; TRIG 272; HDL 45; LDL 92   3. Essential hypertension, benign:  Stable    4. Paroxysmal supraventricular tachycardia: Rate controlled on diltiazem. 5.  Carotid stenosis: Stable  Doppler 4/5/19> less than 50% bilateral  Doppler 3/18/16> 54-65% KAROL, 9-11% LICA  Doppler 9/6/72> 48-34% KAROL, 9-23% LICA      Plan:  Ms Linda Dumont appears stable. No med changes. Regular exercise has been discussed and encouraged. FU 6 months. Thank you for allowing me to participate in the care of Andreas Calderon. Toñitoibe's attestation: This note was scribed in the presence of Dr Melodie Cadena MD by Donnell Padilla, RYDER. The scribe's documentation has been prepared under my direction and personally reviewed by me in its entirety. I confirm that the note above accurately reflects all work, treatment, procedures, and medical decision making performed by me.

## 2021-05-10 RX ORDER — RANOLAZINE 500 MG/1
TABLET, EXTENDED RELEASE ORAL
Qty: 180 TABLET | Refills: 3 | Status: SHIPPED | OUTPATIENT
Start: 2021-05-10 | End: 2022-04-07

## 2021-06-03 RX ORDER — DILTIAZEM HYDROCHLORIDE 120 MG/1
120 CAPSULE, COATED, EXTENDED RELEASE ORAL DAILY
Qty: 90 CAPSULE | Refills: 3 | Status: SHIPPED | OUTPATIENT
Start: 2021-06-03 | End: 2022-04-04 | Stop reason: SDUPTHER

## 2021-11-05 ENCOUNTER — OFFICE VISIT (OUTPATIENT)
Dept: CARDIOLOGY CLINIC | Age: 76
End: 2021-11-05
Payer: MEDICARE

## 2021-11-05 VITALS
BODY MASS INDEX: 37.89 KG/M2 | HEIGHT: 60 IN | HEART RATE: 97 BPM | SYSTOLIC BLOOD PRESSURE: 124 MMHG | OXYGEN SATURATION: 99 % | WEIGHT: 193 LBS | DIASTOLIC BLOOD PRESSURE: 64 MMHG

## 2021-11-05 DIAGNOSIS — I25.10 ATHEROSCLEROSIS OF NATIVE CORONARY ARTERY OF NATIVE HEART WITHOUT ANGINA PECTORIS: ICD-10-CM

## 2021-11-05 DIAGNOSIS — I10 ESSENTIAL HYPERTENSION, BENIGN: Primary | ICD-10-CM

## 2021-11-05 DIAGNOSIS — I47.1 PAROXYSMAL SUPRAVENTRICULAR TACHYCARDIA (HCC): ICD-10-CM

## 2021-11-05 PROCEDURE — 1036F TOBACCO NON-USER: CPT | Performed by: INTERNAL MEDICINE

## 2021-11-05 PROCEDURE — G8417 CALC BMI ABV UP PARAM F/U: HCPCS | Performed by: INTERNAL MEDICINE

## 2021-11-05 PROCEDURE — 93000 ELECTROCARDIOGRAM COMPLETE: CPT | Performed by: INTERNAL MEDICINE

## 2021-11-05 PROCEDURE — 4040F PNEUMOC VAC/ADMIN/RCVD: CPT | Performed by: INTERNAL MEDICINE

## 2021-11-05 PROCEDURE — 1123F ACP DISCUSS/DSCN MKR DOCD: CPT | Performed by: INTERNAL MEDICINE

## 2021-11-05 PROCEDURE — G8484 FLU IMMUNIZE NO ADMIN: HCPCS | Performed by: INTERNAL MEDICINE

## 2021-11-05 PROCEDURE — G8400 PT W/DXA NO RESULTS DOC: HCPCS | Performed by: INTERNAL MEDICINE

## 2021-11-05 PROCEDURE — 99214 OFFICE O/P EST MOD 30 MIN: CPT | Performed by: INTERNAL MEDICINE

## 2021-11-05 PROCEDURE — 1090F PRES/ABSN URINE INCON ASSESS: CPT | Performed by: INTERNAL MEDICINE

## 2021-11-05 PROCEDURE — G8427 DOCREV CUR MEDS BY ELIG CLIN: HCPCS | Performed by: INTERNAL MEDICINE

## 2021-11-05 PROCEDURE — 3017F COLORECTAL CA SCREEN DOC REV: CPT | Performed by: INTERNAL MEDICINE

## 2021-11-05 RX ORDER — ALLOPURINOL 100 MG/1
100 TABLET ORAL 2 TIMES DAILY
COMMUNITY
Start: 2021-08-04

## 2021-11-05 RX ORDER — MAGNESIUM OXIDE 400 MG/1
400 TABLET ORAL DAILY
COMMUNITY
Start: 2021-03-13

## 2021-11-05 NOTE — PROGRESS NOTES
[address]   Cardiac Evaluation      Patient: Kamala Hyman  YOB: 1945  Date: 21     Chief Complaint   Patient presents with    Coronary Artery Disease    Hyperlipidemia    Hypertension      Referring provider: Adenike De Los Santos    History of Present Illness:   Ms. Laureen Israel underwent CABG  with LIMA-LAD, left radial graft extended as T-graft from LIMA to diagonal and PDA. She had c/o chest pain, then abnormal GXT which led to cath showing progressive disease of the LCX with collaterals but no intervention 3/17. She was placed on Ranexa for chest pain. She has hypertension, diabetes, and hyperlipidemia. Today, Ms Laureen Israel states she has been well. She denies palpitations, LUEVANO, dizziness, or edema. Lenore Starks walks for exercise. She has used NTG 1x every couple weeks, usually after stairs and this is unchanged. Past Medical History:   has a past medical history of CAD (coronary artery disease), Diabetes, Hyperlipidemia, Hypertension, Leg edema, and Obesity. Surgical History:   has a past surgical history that includes Cardiac catheterization and Coronary artery bypass graft. Social History:   reports that she has never smoked. She does not have any smokeless tobacco history on file. 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 Her granddaughter has been treated for Crohns and has been diagnosed with lymphoma (in remission currently). Her   suddenly of probable heart disease. Family History:  family history includes Heart Disease in her father.      Allergies:  Lipitor [atorvastatin]     Current Outpatient Medications on File Prior to Visit   Medication Sig Dispense Refill    allopurinol (ZYLOPRIM) 100 MG tablet Take 50 mg by mouth Every Monday, Wednesday, and Friday      magnesium oxide (MAG-OX) 400 MG tablet Take 400 mg by mouth daily      dilTIAZem (CARDIZEM CD) 120 MG extended release capsule TAKE 1 CAPSULE BY MOUTH  DAILY 90 capsule 3    ranolazine (RANEXA) 500 MG extended release tablet TAKE 1 TABLET BY MOUTH  TWICE DAILY 180 tablet 3    clopidogrel (PLAVIX) 75 MG tablet TAKE 1 TABLET BY MOUTH  DAILY 90 tablet 3    ramipril (ALTACE) 5 MG capsule TAKE 1 CAPSULE BY MOUTH DAILY 90 capsule 3    zinc sulfate (ZINCATE) 220 (50 Zn) MG capsule Take 50 mg by mouth daily      atenolol (TENORMIN) 50 MG tablet Take 50 mg by mouth daily      potassium chloride (KLOR-CON M20) 20 MEQ extended release tablet Take 20 mEq by mouth 2 times daily       Aspirin Buf,[id number], (BUFFERED ASPIRIN) 325 MG TABS Take by mouth      furosemide (LASIX) 40 MG tablet Take 1 tablet by mouth 2 times daily (Patient taking differently: Take 40 mg by mouth daily ) 60 tablet 5    calcium carbonate (OSCAL) 500 MG TABS tablet Take 1,000 mg by mouth daily      vitamin D (CHOLECALCIFEROL) 1000 UNIT TABS tablet Take 1,000 Units by mouth daily      Calcium Carb-Cholecalciferol (CALCIUM 500+D PO) Take by mouth      loperamide (IMODIUM) 2 MG capsule Take 2 mg by mouth daily       pravastatin (PRAVACHOL) 20 MG tablet Take 20 mg by mouth daily       levothyroxine (SYNTHROID) 100 MCG tablet Take 100 mcg by mouth daily       loratadine (CLARITIN) 10 MG tablet Take 10 mg by mouth daily.  folic acid (FOLVITE) 1 MG tablet Take 1 mg by mouth daily.  ezetimibe (ZETIA) 10 MG tablet Take 10 mg by mouth daily.  multivitamin (THERAGRAN) per tablet Take 1 tablet by mouth daily.  nitroGLYCERIN (NITROSTAT) 0.4 MG SL tablet PLACE 1 TABLET UNDER THE TONGUE EVERY 5 MINUTES AS NEEDED FOR CHEST PAINS 25 tablet 3     No current facility-administered medications on file prior to visit. Review of Systems:   · Constitutional: there has been no unanticipated weight loss. No change in energy or activity level   · Eyes: No visual changes   · ENT: No Headaches, hearing loss or vertigo.  No mouth sores or sore throat. · Cardiovascular: Reviewed in HPI  · Respiratory: No cough or wheezing, no sputum production. No hematemesis. · Gastrointestinal: No abdominal pain, appetite loss, blood in stools. No change in bowel or bladder habits. · Genitourinary: No nocturia, dysuria, trouble voiding  · Musculoskeletal:  No gait disturbance, weakness or joint complaints. · Integumentary: No rash or pruritis. · Neurological: No headache, change in muscle strength, numbness or tingling. No change in gait, balance, coordination, mood, affect, memory, mentation, behavior. · Psychiatric: No anxiety or depression  · Endocrine: No malaise or fever  · Hematologic/Lymphatic: No abnormal bruising or bleeding, blood clots or swollen lymph nodes. · Allergic/Immunologic: No nasal congestion or hives. Physical Examination:    Vitals:    11/05/21 1137   BP: 124/64   Site: Left Upper Arm   Position: Sitting   Cuff Size: Medium Adult   Pulse: 97   SpO2: 99%   Weight: 193 lb (87.5 kg)   Height: 5' (1.524 m)     Wt Readings from Last 3 Encounters:   11/05/21 193 lb (87.5 kg)   04/28/21 197 lb (89.4 kg)   10/28/20 200 lb (90.7 kg)     BP Readings from Last 3 Encounters:   11/05/21 124/64   04/28/21 120/66   10/28/20 126/70     Constitutional and General Appearance:  appears stated age  Respiratory:  · Normal excursion and expansion without use of accessory muscles  · Resp Auscultation: Normal breath sounds without dullness  Cardiovascular:  · The apical impulses not displaced  · Heart is regular rate and rhythm with normal S1, S2, short 1/6 STELLA  · The carotid upstroke is normal, bruit noted   · JVP is not elevated  · Peripheral pulses are symmetrical  · There is no clubbing, cyanosis of the extremities  · No edema.  S venous stasis skin changes  · Femoral Arteries: 2+ and equal  · Pedal Pulses: 2+ and equal   Abdomen:  · No masses or tenderness  · Normal bowel sounds  Neurological/Psychiatric:  · Alert and oriented x3  · Moves all extremities well, cath site stable  · Exhibits normal gait balance and coordination    Assessment:      1. CAD /CABG 2003 (LIMA-LAD, RA T-graft off LIMA to diagonal and posterior descending artery) : Stable exertional angina at high work loads, unchanged. EKG today with nonspecific st t changes also unchanged. -CATH 3/17 (after abnormal GXT)> LVEDP - 6, normal WM with EF 60%  Cors:LM - nl, LAD - 95% prox and mid; mid and distal vessel fills by LIMA, LCX - 100% prox with collateral filling of OM1 and the distal LCX by L-L collaterals. RCA - dominant and with irreg throughout; PDA - 50% mid. [address] - LAD patent, Y-RA from LIMA to DX patent, occluded to PDA  Plan: medical therapy. MV defect from occluded LCX with collaterals  -GXT abel 10/11> normal myocardial perfusion with EF 67%  -GXT abel 10/08> normal myocardial perfusion, runs of atrial fibrillation with RVR    2. Other and unspecified hyperlipidemia: Stable on Pravachol 20mg and Zetia 10mg daily  Labs 3/17/21: ; TRIG 272; HDL 45; LDL 92   3. Essential hypertension, benign:  Stable    4. Paroxysmal supraventricular tachycardia: Rate controlled on diltiazem. 5.  Carotid stenosis: Stable  Doppler 4/5/19> less than 50% bilateral  Doppler 3/18/16> 10-53% KAROL, 1-80% LICA  Doppler 1/0/71> 78-34% KAROL, 4-71% LICA      Plan:  Shabbir Yates has been advised to continue meds as they are. She is encouraged to continue regular exercise. FU 6 months. Thank you for allowing me to participate in the care of Violette Umanzor. Scribe's attestation: This note was scribed in the presence of Dr Chip Richards MD by Lindsey Candelaria RN. The scribe's documentation has been prepared under my direction and personally reviewed by me in its entirety. I confirm that the note above accurately reflects all work, treatment, procedures, and medical decision making performed by me.

## 2022-01-04 RX ORDER — RAMIPRIL 5 MG/1
5 CAPSULE ORAL DAILY
Qty: 90 CAPSULE | Refills: 3 | Status: SHIPPED | OUTPATIENT
Start: 2022-01-04

## 2022-03-10 RX ORDER — CLOPIDOGREL BISULFATE 75 MG/1
75 TABLET ORAL DAILY
Qty: 90 TABLET | Refills: 0 | Status: SHIPPED | OUTPATIENT
Start: 2022-03-10 | End: 2022-06-01

## 2022-03-24 ENCOUNTER — TELEPHONE (OUTPATIENT)
Dept: CARDIOLOGY CLINIC | Age: 77
End: 2022-03-24

## 2022-04-01 RX ORDER — NITROGLYCERIN 0.4 MG/1
TABLET SUBLINGUAL
Qty: 25 TABLET | Refills: 3 | Status: SHIPPED | OUTPATIENT
Start: 2022-04-01

## 2022-04-04 RX ORDER — DILTIAZEM HYDROCHLORIDE 120 MG/1
120 CAPSULE, COATED, EXTENDED RELEASE ORAL DAILY
Qty: 90 CAPSULE | Refills: 1 | Status: SHIPPED | OUTPATIENT
Start: 2022-04-04 | End: 2022-07-27

## 2022-04-07 RX ORDER — RANOLAZINE 500 MG/1
TABLET, EXTENDED RELEASE ORAL
Qty: 180 TABLET | Refills: 3 | Status: SHIPPED | OUTPATIENT
Start: 2022-04-07

## 2022-05-16 ENCOUNTER — OFFICE VISIT (OUTPATIENT)
Dept: CARDIOLOGY CLINIC | Age: 77
End: 2022-05-16
Payer: MEDICARE

## 2022-05-16 VITALS
DIASTOLIC BLOOD PRESSURE: 62 MMHG | OXYGEN SATURATION: 98 % | BODY MASS INDEX: 39.85 KG/M2 | HEART RATE: 84 BPM | SYSTOLIC BLOOD PRESSURE: 122 MMHG | WEIGHT: 203 LBS | HEIGHT: 60 IN

## 2022-05-16 DIAGNOSIS — I25.10 ATHEROSCLEROSIS OF NATIVE CORONARY ARTERY OF NATIVE HEART WITHOUT ANGINA PECTORIS: Primary | ICD-10-CM

## 2022-05-16 DIAGNOSIS — R60.0 LOCALIZED EDEMA: ICD-10-CM

## 2022-05-16 DIAGNOSIS — I10 ESSENTIAL HYPERTENSION, BENIGN: ICD-10-CM

## 2022-05-16 DIAGNOSIS — E78.5 HYPERLIPIDEMIA, UNSPECIFIED HYPERLIPIDEMIA TYPE: ICD-10-CM

## 2022-05-16 PROCEDURE — 1036F TOBACCO NON-USER: CPT | Performed by: INTERNAL MEDICINE

## 2022-05-16 PROCEDURE — 1123F ACP DISCUSS/DSCN MKR DOCD: CPT | Performed by: INTERNAL MEDICINE

## 2022-05-16 PROCEDURE — 3017F COLORECTAL CA SCREEN DOC REV: CPT | Performed by: INTERNAL MEDICINE

## 2022-05-16 PROCEDURE — G8400 PT W/DXA NO RESULTS DOC: HCPCS | Performed by: INTERNAL MEDICINE

## 2022-05-16 PROCEDURE — 4040F PNEUMOC VAC/ADMIN/RCVD: CPT | Performed by: INTERNAL MEDICINE

## 2022-05-16 PROCEDURE — 99214 OFFICE O/P EST MOD 30 MIN: CPT | Performed by: INTERNAL MEDICINE

## 2022-05-16 PROCEDURE — 1090F PRES/ABSN URINE INCON ASSESS: CPT | Performed by: INTERNAL MEDICINE

## 2022-05-16 PROCEDURE — G8428 CUR MEDS NOT DOCUMENT: HCPCS | Performed by: INTERNAL MEDICINE

## 2022-05-16 PROCEDURE — G8417 CALC BMI ABV UP PARAM F/U: HCPCS | Performed by: INTERNAL MEDICINE

## 2022-05-16 NOTE — PROGRESS NOTES
capsule by mouth daily 90 capsule 1    clopidogrel (PLAVIX) 75 MG tablet TAKE 1 TABLET BY MOUTH  DAILY 90 tablet 0    ramipril (ALTACE) 5 MG capsule TAKE 1 CAPSULE BY MOUTH DAILY 90 capsule 3    allopurinol (ZYLOPRIM) 100 MG tablet Take 50 mg by mouth daily       magnesium oxide (MAG-OX) 400 MG tablet Take 400 mg by mouth daily      zinc sulfate (ZINCATE) 220 (50 Zn) MG capsule Take 50 mg by mouth daily      atenolol (TENORMIN) 50 MG tablet Take 50 mg by mouth daily      potassium chloride (KLOR-CON M20) 20 MEQ extended release tablet Take 20 mEq by mouth daily       Aspirin Buf,[id number], (BUFFERED ASPIRIN) 325 MG TABS Take by mouth      furosemide (LASIX) 40 MG tablet Take 1 tablet by mouth 2 times daily (Patient taking differently: Take 40 mg by mouth daily ) 60 tablet 5    calcium carbonate (OSCAL) 500 MG TABS tablet Take 1,000 mg by mouth daily      vitamin D (CHOLECALCIFEROL) 1000 UNIT TABS tablet Take 1,000 Units by mouth daily 2 tabs a day.  Calcium Carb-Cholecalciferol (CALCIUM 500+D PO) Take by mouth      pravastatin (PRAVACHOL) 20 MG tablet Take 20 mg by mouth daily       levothyroxine (SYNTHROID) 100 MCG tablet Take 100 mcg by mouth daily       folic acid (FOLVITE) 1 MG tablet Take 1 mg by mouth daily.  ezetimibe (ZETIA) 10 MG tablet Take 10 mg by mouth daily.  nitroGLYCERIN (NITROSTAT) 0.4 MG SL tablet ONE TABLET UNDER TONGUE AS NEEDED FOR CHEST PAIN 25 tablet 3    loperamide (IMODIUM) 2 MG capsule Take 2 mg by mouth daily       loratadine (CLARITIN) 10 MG tablet Take 10 mg by mouth daily.  multivitamin (THERAGRAN) per tablet Take 1 tablet by mouth daily. No current facility-administered medications on file prior to visit. Review of Systems:   · Constitutional: there has been no unanticipated weight loss. No change in energy or activity level   · Eyes: No visual changes   · ENT: No Headaches, hearing loss or vertigo.  No mouth sores or sore throat. · Cardiovascular: Reviewed in HPI  · Respiratory: No cough or wheezing, no sputum production. No hematemesis. · Gastrointestinal: No abdominal pain, appetite loss, blood in stools. No change in bowel or bladder habits. · Genitourinary: No nocturia, dysuria, trouble voiding  · Musculoskeletal:  No gait disturbance, weakness or joint complaints. · Integumentary: No rash or pruritis. · Neurological: No headache, change in muscle strength, numbness or tingling. No change in gait, balance, coordination, mood, affect, memory, mentation, behavior. · Psychiatric: No anxiety or depression  · Endocrine: No malaise or fever  · Hematologic/Lymphatic: No abnormal bruising or bleeding, blood clots or swollen lymph nodes. · Allergic/Immunologic: No nasal congestion or hives.     Physical Examination:    Vitals:    05/16/22 1313   BP: 122/62   Site: Left Upper Arm   Position: Sitting   Cuff Size: Medium Adult   Pulse: 84   SpO2: 98%   Weight: 203 lb (92.1 kg)   Height: 5' (1.524 m)     Wt Readings from Last 3 Encounters:   05/16/22 203 lb (92.1 kg)   11/05/21 193 lb (87.5 kg)   04/28/21 197 lb (89.4 kg)     BP Readings from Last 3 Encounters:   05/16/22 122/62   11/05/21 124/64   04/28/21 120/66     Constitutional and General Appearance:  appears stated age  Respiratory:  · Normal excursion and expansion without use of accessory muscles  · Resp Auscultation: Normal breath sounds without dullness  Cardiovascular:  · The apical impulses not displaced  · Heart is regular rate and rhythm with normal S1, S2, short 1/6 STELLA  · The carotid upstroke is normal, bruit noted   · JVP is not elevated  · Peripheral pulses are symmetrical  · There is no clubbing, cyanosis of the extremities  · 2+ LLE edema, no edema RLE,  venous stasis skin changes  · Femoral Arteries: 2+ and equal  · Pedal Pulses: 2+ and equal   Abdomen:  · No masses or tenderness  · Normal bowel sounds  Neurological/Psychiatric:  · Alert and oriented x3  · Moves all extremities well, cath site stable  · Exhibits normal gait balance and coordination    Assessment:      1. CAD /CABG 2003 (LIMA-LAD, RA T-graft off LIMA to diagonal and posterior descending artery) : Stable exertional angina at high work loads, unchanged  -CATH 3/17 (after abnormal GXT)> LVEDP - 6, normal WM with EF 60%  Cors:LM - nl, LAD - 95% prox and mid; mid and distal vessel fills by LIMA, LCX - 100% prox with collateral filling of OM1 and the distal LCX by L-L collaterals. RCA - dominant and with irreg throughout; PDA - 50% mid. [address] - LAD patent, Y-RA from LIMA to DX patent, occluded to PDA  Plan: medical therapy. MV defect from occluded LCX with collaterals  -GXT abel 10/11> normal myocardial perfusion with EF 67%  -GXT abel 10/08> normal myocardial perfusion, runs of atrial fibrillation with RVR    2. Other and unspecified hyperlipidemia: Stable on Pravachol 20mg and Zetia 10mg daily  Labs 5/5/22: ; TRIG 295; HDL 47; LDL 77   3. Essential hypertension, benign:  Stable    4. Paroxysmal supraventricular tachycardia: Rate controlled on diltiazem. 5.  Carotid stenosis: Stable  Doppler 4/5/19> less than 50% bilateral  Doppler 3/18/16> 98-88% KAROL, 4-09% LICA  Doppler 4/3/63> 19-68% KAROL, 8-74% LICA      Plan: Will obtain an echo due to her edema. No med changes. Encouraged to continue regular walking. FU 6 months. Scribe's attestation: This note was scribed in the presence of Dr Asael Dupont MD by Austin Bentley RN. The scribe's documentation has been prepared under my direction and personally reviewed by me in its entirety. I confirm that the note above accurately reflects all work, treatment, procedures, and medical decision making performed by me. Thank you for allowing me to participate in the care of Marilyn Solorzano

## 2022-06-01 RX ORDER — CLOPIDOGREL BISULFATE 75 MG/1
75 TABLET ORAL DAILY
Qty: 90 TABLET | Refills: 3 | Status: SHIPPED | OUTPATIENT
Start: 2022-06-01

## 2022-06-03 ENCOUNTER — HOSPITAL ENCOUNTER (OUTPATIENT)
Dept: CARDIOLOGY | Age: 77
Discharge: HOME | End: 2022-06-03
Payer: MEDICARE

## 2022-06-03 DIAGNOSIS — I25.10 ATHEROSCLEROSIS OF NATIVE CORONARY ARTERY OF NATIVE HEART WITHOUT ANGINA PECTORIS: ICD-10-CM

## 2022-06-03 DIAGNOSIS — R60.0 LOCALIZED EDEMA: ICD-10-CM

## 2022-06-03 DIAGNOSIS — I10 ESSENTIAL HYPERTENSION, BENIGN: ICD-10-CM

## 2022-06-03 LAB
LV EF: 58 %
LVEF MODALITY: NORMAL

## 2022-06-03 PROCEDURE — 93306 TTE W/DOPPLER COMPLETE: CPT

## 2022-07-27 RX ORDER — DILTIAZEM HYDROCHLORIDE 120 MG/1
120 CAPSULE, COATED, EXTENDED RELEASE ORAL DAILY
Qty: 90 CAPSULE | Refills: 3 | Status: SHIPPED | OUTPATIENT
Start: 2022-07-27

## 2022-11-02 NOTE — PROGRESS NOTES
[address]   Cardiac Evaluation      Patient: Tulio Rhodes  YOB: 1945  Date: 22     Chief Complaint   Patient presents with    Coronary Artery Disease    Hyperlipidemia    Hypertension        Referring provider: Opal Carpio. Jag Hooks MD, MD    History of Present Illness:   Ms. Joe Garrison underwent CABG  with LIMA-LAD, left radial graft extended as T-graft from LIMA to diagonal and PDA. She had c/o chest pain, then abnormal GXT which led to cath showing progressive disease of the LCX with collaterals but no intervention 3/17. She was placed on Ranexa for chest pain. Mirta Rodrigez has hypertension, diabetes, and hyperlipidemia. Today, Ms Joe Garrison states she has been fine. She walks around her block for exercise. Mirta Rodrigez denies chest pain, palpitations, LUEVANO, dizziness, or edema. Past Medical History:   has a past medical history of CAD (coronary artery disease), Diabetes, Hyperlipidemia, Hypertension, Leg edema, and Obesity. Surgical History:   has a past surgical history that includes Cardiac catheterization and Coronary artery bypass graft. Social History:   reports that she has never smoked. She does not have any smokeless tobacco history on file. 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 Her granddaughter has been treated for Crohns and has been diagnosed with lymphoma (in remission currently). Her   suddenly of probable heart disease. Family History:  family history includes Heart Disease in her father.      Allergies:  Lipitor [atorvastatin]     Current Outpatient Medications on File Prior to Visit   Medication Sig Dispense Refill    aspirin 81 MG EC tablet Take 81 mg by mouth daily      dilTIAZem (CARDIZEM CD) 120 MG extended release capsule TAKE 1 CAPSULE BY MOUTH  DAILY 90 capsule 3    clopidogrel (PLAVIX) 75 MG tablet TAKE 1 TABLET BY MOUTH  DAILY 90 tablet 3    ranolazine (RANEXA) 500 MG extended release tablet TAKE 1 TABLET BY MOUTH  TWICE DAILY 180 tablet 3    ramipril (ALTACE) 5 MG capsule TAKE 1 CAPSULE BY MOUTH DAILY 90 capsule 3    allopurinol (ZYLOPRIM) 100 MG tablet Take 100 mg by mouth 2 times daily      magnesium oxide (MAG-OX) 400 MG tablet Take 400 mg by mouth daily      zinc sulfate (ZINCATE) 220 (50 Zn) MG capsule Take 50 mg by mouth daily      atenolol (TENORMIN) 50 MG tablet Take 50 mg by mouth daily      potassium chloride (KLOR-CON M) 20 MEQ extended release tablet Take 20 mEq by mouth daily       furosemide (LASIX) 40 MG tablet Take 1 tablet by mouth 2 times daily (Patient taking differently: Take 40 mg by mouth daily) 60 tablet 5    calcium carbonate (OSCAL) 500 MG TABS tablet Take 1,000 mg by mouth daily      vitamin D (CHOLECALCIFEROL) 1000 UNIT TABS tablet Take 1,000 Units by mouth daily 2 tabs a day. Calcium Carb-Cholecalciferol (CALCIUM 500+D PO) Take by mouth      loperamide (IMODIUM) 2 MG capsule Take 2 mg by mouth daily       pravastatin (PRAVACHOL) 20 MG tablet Take 20 mg by mouth daily       levothyroxine (SYNTHROID) 100 MCG tablet Take 100 mcg by mouth daily       loratadine (CLARITIN) 10 MG tablet Take 10 mg by mouth daily. folic acid (FOLVITE) 1 MG tablet Take 1 mg by mouth daily. ezetimibe (ZETIA) 10 MG tablet Take 10 mg by mouth daily. multivitamin (THERAGRAN) per tablet Take 1 tablet by mouth daily. nitroGLYCERIN (NITROSTAT) 0.4 MG SL tablet ONE TABLET UNDER TONGUE AS NEEDED FOR CHEST PAIN 25 tablet 3     No current facility-administered medications on file prior to visit. Review of Systems:   Constitutional: there has been no unanticipated weight loss. No change in energy or activity level   Eyes: No visual changes   ENT: No Headaches, hearing loss or vertigo. No mouth sores or sore throat. Cardiovascular: Reviewed in HPI  Respiratory: No cough or wheezing, no sputum production. No hematemesis.     Gastrointestinal: No abdominal pain, appetite loss, blood in stools. No change in bowel or bladder habits. Genitourinary: No nocturia, dysuria, trouble voiding  Musculoskeletal:  No gait disturbance, weakness or joint complaints. Integumentary: No rash or pruritis. Neurological: No headache, change in muscle strength, numbness or tingling. No change in gait, balance, coordination, mood, affect, memory, mentation, behavior. Psychiatric: No anxiety or depression  Endocrine: No malaise or fever  Hematologic/Lymphatic: No abnormal bruising or bleeding, blood clots or swollen lymph nodes. Allergic/Immunologic: No nasal congestion or hives. Physical Examination:    Vitals:    11/04/22 1306   BP: 110/60   Site: Left Upper Arm   Position: Sitting   Cuff Size: Large Adult   Pulse: 72   SpO2: 95%   Weight: 200 lb (90.7 kg)   Height: 5' (1.524 m)       Wt Readings from Last 3 Encounters:   11/04/22 200 lb (90.7 kg)   05/16/22 203 lb (92.1 kg)   11/05/21 193 lb (87.5 kg)     BP Readings from Last 3 Encounters:   11/04/22 110/60   05/16/22 122/62   11/05/21 124/64     Constitutional and General Appearance:  appears stated age  Respiratory:  Normal excursion and expansion without use of accessory muscles  Resp Auscultation: Normal breath sounds without dullness  Cardiovascular: The apical impulses not displaced  Heart is regular rate and rhythm with normal S1, S2, short 1/6 STELLA  The carotid upstroke is normal, bruit noted   JVP is not elevated  Peripheral pulses are symmetrical  There is no clubbing, cyanosis of the extremities  2+ LLE edema, no edema RLE,  venous stasis skin changes  Femoral Arteries: 2+ and equal  Pedal Pulses: 2+ and equal   Abdomen:  No masses or tenderness  Normal bowel sounds  Neurological/Psychiatric:  Alert and oriented x3  Moves all extremities well, cath site stable  Exhibits normal gait balance and coordination    Assessment:      1.  CAD /CABG 2003 (LIMA-LAD, RA T-graft off LIMA to diagonal and posterior descending artery) : Stable, unchanged   -ECHO 6/3/22: Normal left ventricle size, wall thickness and systolic function with EF of 55-60%. No regional wall motion abnormalities are seen. Diastolic filling parameters suggest grade I diastolic  dysfunction. E/e\"=12.7. Mild mitral annular calcification is present. Aortic valve appears sclerotic but opens adequately. Mild pulmonic and tricuspid regurgitation. Estimated pulmonary artery systolic pressure is normal at 32 mmHg assuming a right atrial pressure of 8 mmHg. The left atrium is mildly dilated  -CATH 3/17 (after abnormal GXT)> LVEDP - 6, normal WM with EF 60%  Cors:LM - nl, LAD - 95% prox and mid; mid and distal vessel fills by LIMA, LCX - 100% prox with collateral filling of OM1 and the distal LCX by L-L collaterals. RCA - dominant and with irreg throughout; PDA - 50% mid. [address] - LAD patent, Y-RA from LIMA to DX patent, occluded to PDA  Plan: medical therapy. MV defect from occluded LCX with collaterals  -GXT abel 10/11> normal myocardial perfusion with EF 67%  -GXT abel 10/08> normal myocardial perfusion, runs of atrial fibrillation with RVR    2. Other and unspecified hyperlipidemia: Stable on Pravachol 20mg and Zetia 10mg daily  Labs 5/5/22: ; TRIG 295; HDL 47; LDL 77   3. Essential hypertension, benign:  well controlled    4. Paroxysmal supraventricular tachycardia: Rate controlled on diltiazem. 5.  Carotid stenosis: Stable  Doppler 4/5/19> less than 50% bilateral  Doppler 3/18/16> 93-88% KAROL, 5-57% LICA  Doppler 7/1/12> 01-86% KAROL, 2-53% LICA      PLAN:  Ms Emmie Eisenmenger appears stable. No med changes. Encouraged to continue regular exercise. FU 6 months. Scribe's attestation: This note was scribed in the presence of Dr Brenda Keller MD by Jacquelyn Minor RN. The scribe's documentation has been prepared under my direction and personally reviewed by me in its entirety.  I confirm that the note above accurately reflects all work, treatment, procedures,

## 2022-11-04 ENCOUNTER — OFFICE VISIT (OUTPATIENT)
Dept: CARDIOLOGY CLINIC | Age: 77
End: 2022-11-04
Payer: MEDICARE

## 2022-11-04 VITALS
WEIGHT: 200 LBS | HEART RATE: 72 BPM | HEIGHT: 60 IN | BODY MASS INDEX: 39.27 KG/M2 | DIASTOLIC BLOOD PRESSURE: 60 MMHG | SYSTOLIC BLOOD PRESSURE: 110 MMHG | OXYGEN SATURATION: 95 %

## 2022-11-04 DIAGNOSIS — E78.5 HYPERLIPIDEMIA, UNSPECIFIED HYPERLIPIDEMIA TYPE: ICD-10-CM

## 2022-11-04 DIAGNOSIS — I10 ESSENTIAL HYPERTENSION, BENIGN: ICD-10-CM

## 2022-11-04 DIAGNOSIS — I25.10 ATHEROSCLEROSIS OF NATIVE CORONARY ARTERY OF NATIVE HEART WITHOUT ANGINA PECTORIS: Primary | ICD-10-CM

## 2022-11-04 PROCEDURE — 99214 OFFICE O/P EST MOD 30 MIN: CPT | Performed by: INTERNAL MEDICINE

## 2022-11-04 PROCEDURE — 1123F ACP DISCUSS/DSCN MKR DOCD: CPT | Performed by: INTERNAL MEDICINE

## 2022-11-04 PROCEDURE — 3074F SYST BP LT 130 MM HG: CPT | Performed by: INTERNAL MEDICINE

## 2022-11-04 PROCEDURE — 1090F PRES/ABSN URINE INCON ASSESS: CPT | Performed by: INTERNAL MEDICINE

## 2022-11-04 PROCEDURE — G8484 FLU IMMUNIZE NO ADMIN: HCPCS | Performed by: INTERNAL MEDICINE

## 2022-11-04 PROCEDURE — 3078F DIAST BP <80 MM HG: CPT | Performed by: INTERNAL MEDICINE

## 2022-11-04 PROCEDURE — G8417 CALC BMI ABV UP PARAM F/U: HCPCS | Performed by: INTERNAL MEDICINE

## 2022-11-04 PROCEDURE — 1036F TOBACCO NON-USER: CPT | Performed by: INTERNAL MEDICINE

## 2022-11-04 PROCEDURE — 3017F COLORECTAL CA SCREEN DOC REV: CPT | Performed by: INTERNAL MEDICINE

## 2022-11-04 PROCEDURE — G8427 DOCREV CUR MEDS BY ELIG CLIN: HCPCS | Performed by: INTERNAL MEDICINE

## 2022-11-04 PROCEDURE — G8400 PT W/DXA NO RESULTS DOC: HCPCS | Performed by: INTERNAL MEDICINE

## 2022-11-04 RX ORDER — ASPIRIN 81 MG/1
81 TABLET ORAL DAILY
COMMUNITY

## 2022-11-14 RX ORDER — NITROGLYCERIN 0.4 MG/1
TABLET SUBLINGUAL
Qty: 25 TABLET | Refills: 3 | Status: SHIPPED | OUTPATIENT
Start: 2022-11-14

## 2022-11-14 NOTE — TELEPHONE ENCOUNTER
Refill was requested from pharmacy. Patient is up to date with appointments.     11/4/2022 OV with [facility]    5/23/2023 Next OV

## 2023-01-10 RX ORDER — RAMIPRIL 5 MG/1
5 CAPSULE ORAL DAILY
Qty: 90 CAPSULE | Refills: 3 | Status: SHIPPED | OUTPATIENT
Start: 2023-01-10

## 2023-03-08 RX ORDER — RANOLAZINE 500 MG/1
TABLET, EXTENDED RELEASE ORAL
Qty: 180 TABLET | Refills: 3 | Status: SHIPPED | OUTPATIENT
Start: 2023-03-08

## 2023-05-22 NOTE — PROGRESS NOTES
[address]   Cardiac Evaluation      Patient: Ty Marcum  YOB: 1945  Date: 23     Chief Complaint   Patient presents with    Hyperlipidemia    Hypertension    Coronary Artery Disease        Referring provider: Garham Kimbrough MD    History of Present Illness:   Ms. Alberto John underwent CABG  with LIMA-LAD, left radial graft extended as T-graft from LIMA to diagonal and PDA. She had c/o chest pain, then abnormal GXT which led to cath showing progressive disease of the LCX with collaterals but no intervention 3/17. She was placed on Ranexa for chest pain. Angela Agarwal has hypertension, diabetes, and hyperlipidemia. Today, Ms Alberto John states she has been \"alright\". She is on insulin now after a1c in  was 10. Angela Agarwal denies chest pain, palpitations, LUEVANO, dizziness, or edema. Past Medical History:   has a past medical history of CAD (coronary artery disease), Diabetes, Hyperlipidemia, Hypertension, Leg edema, and Obesity. Surgical History:   has a past surgical history that includes Cardiac catheterization and Coronary artery bypass graft. Social History:   reports that she has never smoked. She does not have any smokeless tobacco history on file. 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 Her granddaughter has been treated for Crohns and has been diagnosed with lymphoma (in remission currently). Her   suddenly of probable heart disease. Family History:  family history includes Heart Disease in her father.      Allergies:  Lipitor [atorvastatin]     Current Outpatient Medications on File Prior to Visit   Medication Sig Dispense Refill    FARXIGA 5 MG tablet Take 1 tablet by mouth daily      LANTUS SOLOSTAR 100 UNIT/ML injection pen ADMINISTER 20 UNITS UNDER THE SKIN EVERY NIGHT      clopidogrel (PLAVIX) 75 MG tablet TAKE 1 TABLET BY MOUTH  DAILY 90 tablet 3    ramipril

## 2023-05-23 ENCOUNTER — OFFICE VISIT (OUTPATIENT)
Dept: CARDIOLOGY CLINIC | Age: 78
End: 2023-05-23
Payer: MEDICARE

## 2023-05-23 VITALS
BODY MASS INDEX: 38.48 KG/M2 | DIASTOLIC BLOOD PRESSURE: 50 MMHG | WEIGHT: 196 LBS | HEART RATE: 66 BPM | OXYGEN SATURATION: 98 % | HEIGHT: 60 IN | SYSTOLIC BLOOD PRESSURE: 102 MMHG

## 2023-05-23 DIAGNOSIS — I25.10 ATHEROSCLEROSIS OF NATIVE CORONARY ARTERY OF NATIVE HEART WITHOUT ANGINA PECTORIS: ICD-10-CM

## 2023-05-23 DIAGNOSIS — E78.5 HYPERLIPIDEMIA, UNSPECIFIED HYPERLIPIDEMIA TYPE: ICD-10-CM

## 2023-05-23 DIAGNOSIS — I10 ESSENTIAL HYPERTENSION, BENIGN: Primary | ICD-10-CM

## 2023-05-23 PROCEDURE — G8400 PT W/DXA NO RESULTS DOC: HCPCS | Performed by: INTERNAL MEDICINE

## 2023-05-23 PROCEDURE — 3074F SYST BP LT 130 MM HG: CPT | Performed by: INTERNAL MEDICINE

## 2023-05-23 PROCEDURE — 99214 OFFICE O/P EST MOD 30 MIN: CPT | Performed by: INTERNAL MEDICINE

## 2023-05-23 PROCEDURE — 1036F TOBACCO NON-USER: CPT | Performed by: INTERNAL MEDICINE

## 2023-05-23 PROCEDURE — 1090F PRES/ABSN URINE INCON ASSESS: CPT | Performed by: INTERNAL MEDICINE

## 2023-05-23 PROCEDURE — 1123F ACP DISCUSS/DSCN MKR DOCD: CPT | Performed by: INTERNAL MEDICINE

## 2023-05-23 PROCEDURE — 3078F DIAST BP <80 MM HG: CPT | Performed by: INTERNAL MEDICINE

## 2023-05-23 PROCEDURE — 93000 ELECTROCARDIOGRAM COMPLETE: CPT | Performed by: INTERNAL MEDICINE

## 2023-05-23 PROCEDURE — G8427 DOCREV CUR MEDS BY ELIG CLIN: HCPCS | Performed by: INTERNAL MEDICINE

## 2023-05-23 PROCEDURE — G8417 CALC BMI ABV UP PARAM F/U: HCPCS | Performed by: INTERNAL MEDICINE

## 2023-05-23 RX ORDER — INSULIN GLARGINE 100 [IU]/ML
INJECTION, SOLUTION SUBCUTANEOUS
COMMUNITY
Start: 2023-04-27

## 2023-05-23 RX ORDER — PRAVASTATIN SODIUM 40 MG
40 TABLET ORAL DAILY
Qty: 90 TABLET | Refills: 3 | Status: SHIPPED | OUTPATIENT
Start: 2023-05-23

## 2023-05-23 RX ORDER — DAPAGLIFLOZIN 5 MG/1
5 TABLET, FILM COATED ORAL DAILY
COMMUNITY
Start: 2023-05-19

## 2023-10-24 RX ORDER — DILTIAZEM HYDROCHLORIDE 120 MG/1
120 CAPSULE, COATED, EXTENDED RELEASE ORAL DAILY
Qty: 90 CAPSULE | Refills: 3 | Status: SHIPPED | OUTPATIENT
Start: 2023-10-24

## 2023-10-24 NOTE — TELEPHONE ENCOUNTER
Last OV: 23  Last labs: 3/23/23  Last EK23  Appt scheduled : 23               dilTIAZem (CARDIZEM CD) 120 MG extended release capsule 90 capsule 3 2022     Sig - Route: TAKE 1 CAPSULE BY MOUTH  DAILY - Oral    Sent to pharmacy as: dilTIAZem HCl ER Coated Beads 120 MG Oral Capsule Extended Release 24 Hour (CARDIZEM CD)    Notes to Pharmacy: Requesting 1 year supply    E-Prescribing Status: Receipt confirmed by pharmacy (2022 10:08 AM EDT)

## 2023-11-21 NOTE — PROGRESS NOTES
[facility]   Cardiac Evaluation      Patient: Sherman Bloch  YOB: 1945  Date: 23     Chief Complaint   Patient presents with    Hypertension    Hyperlipidemia        Referring provider: Fabián Louise MD    History of Present Illness:   Ms. Seven Hdez underwent CABG  with LIMA-LAD, left radial graft extended as T-graft from LIMA to diagonal and PDA. She had c/o chest pain, then abnormal GXT which led to cath showing progressive disease of the LCX with collaterals but no intervention 3/17. She was placed on Ranexa for chest pain. Mahin Call has hypertension, DM, and hyperlipidemia. Today, Ms Seven Hdez states she has been doing ok. She denies chest pain, palpitations, dizziness. Her legs are mildly edematous. She walks for exercise. She now has a continuous glucose monitor and her A1C is lower than it has been for years. Past Medical History:   has a past medical history of CAD (coronary artery disease), Diabetes, Hyperlipidemia, Hypertension, Leg edema, and Obesity. Surgical History:   has a past surgical history that includes Cardiac catheterization and Coronary artery bypass graft. Social History:   reports that she has never smoked. She does not have any smokeless tobacco history on file. 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 Her granddaughter has been treated for Crohns and has been diagnosed with lymphoma (in remission currently). Her   suddenly of probable heart disease. Family History:  family history includes Heart Disease in her father.      Allergies:  Lipitor [atorvastatin]     Current Outpatient Medications on File Prior to Visit   Medication Sig Dispense Refill    dilTIAZem (CARDIZEM CD) 120 MG extended release capsule TAKE 1 CAPSULE BY MOUTH DAILY 90 capsule 3    FARXIGA 5 MG tablet Take 1 tablet by mouth daily      LANTUS SOLOSTAR 100 UNIT/ML injection pen

## 2023-12-01 ENCOUNTER — OFFICE VISIT (OUTPATIENT)
Dept: CARDIOLOGY CLINIC | Age: 78
End: 2023-12-01
Payer: MEDICARE

## 2023-12-01 VITALS
HEART RATE: 75 BPM | OXYGEN SATURATION: 97 % | SYSTOLIC BLOOD PRESSURE: 118 MMHG | HEIGHT: 60 IN | DIASTOLIC BLOOD PRESSURE: 62 MMHG | WEIGHT: 201 LBS | BODY MASS INDEX: 39.46 KG/M2

## 2023-12-01 DIAGNOSIS — I10 ESSENTIAL HYPERTENSION, BENIGN: Primary | ICD-10-CM

## 2023-12-01 DIAGNOSIS — E78.5 HYPERLIPIDEMIA, UNSPECIFIED HYPERLIPIDEMIA TYPE: ICD-10-CM

## 2023-12-01 DIAGNOSIS — I25.10 ATHEROSCLEROSIS OF NATIVE CORONARY ARTERY OF NATIVE HEART WITHOUT ANGINA PECTORIS: ICD-10-CM

## 2023-12-01 PROCEDURE — G8417 CALC BMI ABV UP PARAM F/U: HCPCS | Performed by: INTERNAL MEDICINE

## 2023-12-01 PROCEDURE — G8484 FLU IMMUNIZE NO ADMIN: HCPCS | Performed by: INTERNAL MEDICINE

## 2023-12-01 PROCEDURE — 3074F SYST BP LT 130 MM HG: CPT | Performed by: INTERNAL MEDICINE

## 2023-12-01 PROCEDURE — 99214 OFFICE O/P EST MOD 30 MIN: CPT | Performed by: INTERNAL MEDICINE

## 2023-12-01 PROCEDURE — G8400 PT W/DXA NO RESULTS DOC: HCPCS | Performed by: INTERNAL MEDICINE

## 2023-12-01 PROCEDURE — G8427 DOCREV CUR MEDS BY ELIG CLIN: HCPCS | Performed by: INTERNAL MEDICINE

## 2023-12-01 PROCEDURE — 1036F TOBACCO NON-USER: CPT | Performed by: INTERNAL MEDICINE

## 2023-12-01 PROCEDURE — 1123F ACP DISCUSS/DSCN MKR DOCD: CPT | Performed by: INTERNAL MEDICINE

## 2023-12-01 PROCEDURE — 3078F DIAST BP <80 MM HG: CPT | Performed by: INTERNAL MEDICINE

## 2023-12-01 PROCEDURE — 1090F PRES/ABSN URINE INCON ASSESS: CPT | Performed by: INTERNAL MEDICINE

## 2024-02-27 RX ORDER — RAMIPRIL 5 MG/1
5 CAPSULE ORAL DAILY
Qty: 100 CAPSULE | Refills: 3 | Status: SHIPPED | OUTPATIENT
Start: 2024-02-27

## 2024-03-26 RX ORDER — PRAVASTATIN SODIUM 40 MG
40 TABLET ORAL DAILY
Qty: 90 TABLET | Refills: 3 | Status: SHIPPED | OUTPATIENT
Start: 2024-03-26

## 2024-03-26 NOTE — TELEPHONE ENCOUNTER
Received refill request for pravastatin from [facility] pharmacy.    Last ov: 12/01/2023 DA    Last labs: 10/2023 [facility]    Last Refill: 05/23/2023 #90 w/ 3 refills    Next appointment: 06/07/2024 [facility]

## 2024-04-25 RX ORDER — CLOPIDOGREL BISULFATE 75 MG/1
75 TABLET ORAL DAILY
Qty: 90 TABLET | Refills: 3 | Status: SHIPPED | OUTPATIENT
Start: 2024-04-25

## 2024-04-25 RX ORDER — RANOLAZINE 500 MG/1
TABLET, EXTENDED RELEASE ORAL
Qty: 180 TABLET | Refills: 3 | Status: SHIPPED | OUTPATIENT
Start: 2024-04-25

## 2024-04-25 NOTE — TELEPHONE ENCOUNTER
Requested Prescriptions     Pending Prescriptions Disp Refills    clopidogrel (PLAVIX) 75 MG tablet [Pharmacy Med Name: Clopidogrel Bisulfate 75 MG Oral Tablet] 90 tablet 3     Sig: TAKE 1 TABLET BY MOUTH DAILY    ranolazine (RANEXA) 500 MG extended release tablet [Pharmacy Med Name: Ranolazine  MG Oral Tablet Extended Release 12 Hour] 180 tablet 3     Sig: TAKE 1 TABLET BY MOUTH TWICE  DAILY        [facility]      Last OV:  12/1/2023 DAH    Next OV: 6/7/2024 DAH    Last Labs: 10/25/2023 Lipid , 05/23/2023 ekg    Last Filled: 04/13/2023, 02/27/2024 DAH

## 2024-05-21 NOTE — PROGRESS NOTES
equal   Abdomen:  No masses or tenderness  Normal bowel sounds  Neurological/Psychiatric:  Alert and oriented x3  Moves all extremities well, cath site stable  Exhibits normal gait balance and coordination    Assessment:    1. CAD /CABG 2003 (LIMA-LAD, RA T-graft off LIMA to diagonal and posterior descending artery) : Stable, unchanged   -ECHO 6/3/22: Normal left ventricle size, wall thickness and systolic function with EF of 55-60%.  No regional wall motion abnormalities are seen. Diastolic filling parameters suggest grade I diastolic  dysfunction.E/e\"=12.7. Mild mitral annular calcification is present. Aortic valve appears sclerotic but opens adequately. Mild pulmonic and tricuspid regurgitation. Estimated pulmonary artery systolic pressure is normal at 32 mmHg assuming a right atrial pressure of 8 mmHg. The left atrium is mildly dilated  -CATH 3/17 (after abnormal GXT)> LVEDP - 6, normal WM with EF 60%  Cors:LM - nl, LAD - 95% prox and mid; mid and distal vessel fills by LIMA, LCX - 100% prox with collateral filling of OM1 and the distal LCX by L-L collaterals.RCA - dominant and with irreg throughout; PDA - 50% mid.Grafts:MEDINA - LAD patent, Y-RA from LIMA to DX patent, occluded to PDA  Plan: medical therapy. MV defect from occluded LCX with collaterals  -GXT abel 10/11> normal myocardial perfusion with EF 67%  -GXT abel 10/08> normal myocardial perfusion, runs of atrial fibrillation with RVR    2. Other and unspecified hyperlipidemia: Stable on Pravachol 20mg and Zetia 10mg daily  Labs 10/25/23: ; TRIG 226; HDL 47; LDL 72   3. Essential hypertension, benign:  well controlled, taking Altace, Atenolol   4. Paroxysmal supraventricular tachycardia: Rate controlled on diltiazem.  EKG>NSR, non specific T changes   5.  Carotid stenosis: Stable  Doppler 4/5/19> less than 50% bilateral  Doppler 3/18/16> 16-49% KAROL, 1-15% LICA  Doppler 7/2/12> 16-49% KAROL, 1-15% LICA  5.  DM - 3/30/23: 10.5, now on insulin and

## 2024-06-07 ENCOUNTER — OFFICE VISIT (OUTPATIENT)
Dept: CARDIOLOGY CLINIC | Age: 79
End: 2024-06-07
Payer: MEDICARE

## 2024-06-07 VITALS
WEIGHT: 200 LBS | BODY MASS INDEX: 39.27 KG/M2 | SYSTOLIC BLOOD PRESSURE: 110 MMHG | HEART RATE: 74 BPM | HEIGHT: 60 IN | DIASTOLIC BLOOD PRESSURE: 60 MMHG | OXYGEN SATURATION: 96 %

## 2024-06-07 DIAGNOSIS — I10 ESSENTIAL HYPERTENSION, BENIGN: ICD-10-CM

## 2024-06-07 DIAGNOSIS — I65.23 BILATERAL CAROTID ARTERY STENOSIS: ICD-10-CM

## 2024-06-07 DIAGNOSIS — E78.5 HYPERLIPIDEMIA, UNSPECIFIED HYPERLIPIDEMIA TYPE: ICD-10-CM

## 2024-06-07 DIAGNOSIS — I25.10 ATHEROSCLEROSIS OF NATIVE CORONARY ARTERY OF NATIVE HEART WITHOUT ANGINA PECTORIS: Primary | ICD-10-CM

## 2024-06-07 PROCEDURE — 1036F TOBACCO NON-USER: CPT | Performed by: INTERNAL MEDICINE

## 2024-06-07 PROCEDURE — G8400 PT W/DXA NO RESULTS DOC: HCPCS | Performed by: INTERNAL MEDICINE

## 2024-06-07 PROCEDURE — G8427 DOCREV CUR MEDS BY ELIG CLIN: HCPCS | Performed by: INTERNAL MEDICINE

## 2024-06-07 PROCEDURE — 99214 OFFICE O/P EST MOD 30 MIN: CPT | Performed by: INTERNAL MEDICINE

## 2024-06-07 PROCEDURE — G8417 CALC BMI ABV UP PARAM F/U: HCPCS | Performed by: INTERNAL MEDICINE

## 2024-06-07 PROCEDURE — 1123F ACP DISCUSS/DSCN MKR DOCD: CPT | Performed by: INTERNAL MEDICINE

## 2024-06-07 PROCEDURE — 3074F SYST BP LT 130 MM HG: CPT | Performed by: INTERNAL MEDICINE

## 2024-06-07 PROCEDURE — 3078F DIAST BP <80 MM HG: CPT | Performed by: INTERNAL MEDICINE

## 2024-06-07 PROCEDURE — 1090F PRES/ABSN URINE INCON ASSESS: CPT | Performed by: INTERNAL MEDICINE

## 2024-06-07 RX ORDER — DILTIAZEM HYDROCHLORIDE 120 MG/1
120 CAPSULE, COATED, EXTENDED RELEASE ORAL DAILY
Qty: 90 CAPSULE | Refills: 3 | Status: SHIPPED | OUTPATIENT
Start: 2024-06-07

## 2024-06-07 RX ORDER — NITROGLYCERIN 0.4 MG/1
TABLET SUBLINGUAL
Qty: 25 TABLET | Refills: 3 | Status: SHIPPED | OUTPATIENT
Start: 2024-06-07

## 2025-01-05 DIAGNOSIS — I25.10 ATHEROSCLEROSIS OF NATIVE CORONARY ARTERY OF NATIVE HEART WITHOUT ANGINA PECTORIS: Primary | ICD-10-CM

## 2025-01-07 RX ORDER — CLOPIDOGREL BISULFATE 75 MG/1
75 TABLET ORAL DAILY
Qty: 100 TABLET | Refills: 3 | Status: SHIPPED | OUTPATIENT
Start: 2025-01-07

## 2025-01-07 RX ORDER — PRAVASTATIN SODIUM 40 MG
40 TABLET ORAL DAILY
Qty: 100 TABLET | Refills: 3 | Status: SHIPPED | OUTPATIENT
Start: 2025-01-07

## 2025-01-07 RX ORDER — RANOLAZINE 500 MG/1
TABLET, EXTENDED RELEASE ORAL
Qty: 200 TABLET | Refills: 3 | Status: SHIPPED | OUTPATIENT
Start: 2025-01-07

## 2025-01-07 NOTE — TELEPHONE ENCOUNTER
6/7/2024 OV with DAH    1/29/2025 next OV    6/4/2024 Lipids done 7/30/2021  last CBC done     I spoke to pt and let her know she is due for a CBC. Order was mailed to pt.

## 2025-01-13 NOTE — PROGRESS NOTES
labs 1/2/25      PLAN:  Stable cardiac status. No med changes. Encouraged to continue regular exercise and needs to lose weight.. FU 6 months.     Scribe's attestation: This note was scribed in the presence of Dr Kory AVILES by Renetta Ramey RN.The scribe's documentation has been prepared under my direction and personally reviewed by me in its entirety. I confirm that the note above accurately reflects all work, treatment, procedures, and medical decision making performed by me.         Thank you for allowing me to participate in the care of Cathy Cleveland.

## 2025-01-29 ENCOUNTER — OFFICE VISIT (OUTPATIENT)
Dept: CARDIOLOGY CLINIC | Age: 80
End: 2025-01-29

## 2025-01-29 VITALS
WEIGHT: 206 LBS | HEART RATE: 65 BPM | HEIGHT: 60 IN | SYSTOLIC BLOOD PRESSURE: 118 MMHG | BODY MASS INDEX: 40.44 KG/M2 | DIASTOLIC BLOOD PRESSURE: 62 MMHG | OXYGEN SATURATION: 94 %

## 2025-01-29 DIAGNOSIS — I10 ESSENTIAL HYPERTENSION, BENIGN: ICD-10-CM

## 2025-01-29 DIAGNOSIS — I25.10 ATHEROSCLEROSIS OF NATIVE CORONARY ARTERY OF NATIVE HEART WITHOUT ANGINA PECTORIS: Primary | ICD-10-CM

## 2025-01-29 DIAGNOSIS — E78.5 HYPERLIPIDEMIA, UNSPECIFIED HYPERLIPIDEMIA TYPE: ICD-10-CM

## 2025-02-17 NOTE — TELEPHONE ENCOUNTER
Received refill request for  ramipril (ALTACE) 5 MG capsule  from [facility] pharmacy.     Last OV: 1/29/2025 DAH    Next OV: 7/29/2025 DAH    Last Labs: 4/1/2021 BMP    Last Filled: 2/2/024 DAH

## 2025-02-19 RX ORDER — RAMIPRIL 5 MG/1
5 CAPSULE ORAL DAILY
Qty: 100 CAPSULE | Refills: 2 | Status: SHIPPED | OUTPATIENT
Start: 2025-02-19

## 2025-02-19 RX ORDER — DILTIAZEM HYDROCHLORIDE 120 MG/1
120 CAPSULE, COATED, EXTENDED RELEASE ORAL DAILY
Qty: 90 CAPSULE | Refills: 3 | Status: SHIPPED | OUTPATIENT
Start: 2025-02-19

## 2025-06-09 RX ORDER — NITROGLYCERIN 0.4 MG/1
TABLET SUBLINGUAL
Qty: 25 TABLET | Refills: 3 | Status: SHIPPED | OUTPATIENT
Start: 2025-06-09

## 2025-06-09 NOTE — TELEPHONE ENCOUNTER
Spoke w/ Cathy regarding NTG tabs. She has not had to use any, just needs a refill.  Rx sent to pharmacy

## 2025-07-18 RX ORDER — DILTIAZEM HYDROCHLORIDE 120 MG/1
120 CAPSULE, COATED, EXTENDED RELEASE ORAL DAILY
Qty: 90 CAPSULE | Refills: 3 | Status: SHIPPED | OUTPATIENT
Start: 2025-07-18

## 2025-08-20 ENCOUNTER — OFFICE VISIT (OUTPATIENT)
Dept: CARDIOLOGY CLINIC | Age: 80
End: 2025-08-20
Payer: MEDICARE

## 2025-08-20 VITALS
WEIGHT: 206 LBS | HEIGHT: 61 IN | HEART RATE: 69 BPM | BODY MASS INDEX: 38.89 KG/M2 | OXYGEN SATURATION: 95 % | SYSTOLIC BLOOD PRESSURE: 120 MMHG | DIASTOLIC BLOOD PRESSURE: 66 MMHG

## 2025-08-20 DIAGNOSIS — I10 ESSENTIAL HYPERTENSION, BENIGN: ICD-10-CM

## 2025-08-20 DIAGNOSIS — I65.23 BILATERAL CAROTID ARTERY STENOSIS: ICD-10-CM

## 2025-08-20 DIAGNOSIS — E78.5 HYPERLIPIDEMIA, UNSPECIFIED HYPERLIPIDEMIA TYPE: ICD-10-CM

## 2025-08-20 DIAGNOSIS — I25.10 ATHEROSCLEROSIS OF NATIVE CORONARY ARTERY OF NATIVE HEART WITHOUT ANGINA PECTORIS: Primary | ICD-10-CM

## 2025-08-20 PROCEDURE — 1159F MED LIST DOCD IN RCRD: CPT | Performed by: INTERNAL MEDICINE

## 2025-08-20 PROCEDURE — 3074F SYST BP LT 130 MM HG: CPT | Performed by: INTERNAL MEDICINE

## 2025-08-20 PROCEDURE — 3078F DIAST BP <80 MM HG: CPT | Performed by: INTERNAL MEDICINE

## 2025-08-20 PROCEDURE — 1123F ACP DISCUSS/DSCN MKR DOCD: CPT | Performed by: INTERNAL MEDICINE

## 2025-08-20 PROCEDURE — G2211 COMPLEX E/M VISIT ADD ON: HCPCS | Performed by: INTERNAL MEDICINE

## 2025-08-20 PROCEDURE — 99214 OFFICE O/P EST MOD 30 MIN: CPT | Performed by: INTERNAL MEDICINE
